# Patient Record
Sex: MALE | Race: WHITE
[De-identification: names, ages, dates, MRNs, and addresses within clinical notes are randomized per-mention and may not be internally consistent; named-entity substitution may affect disease eponyms.]

---

## 2019-09-06 PROBLEM — Z00.00 ENCOUNTER FOR PREVENTIVE HEALTH EXAMINATION: Status: ACTIVE | Noted: 2019-09-06

## 2019-09-11 PROBLEM — M25.562 LEFT KNEE PAIN, UNSPECIFIED CHRONICITY: Status: ACTIVE | Noted: 2019-09-11

## 2019-09-12 ENCOUNTER — APPOINTMENT (OUTPATIENT)
Dept: ORTHOPEDIC SURGERY | Facility: CLINIC | Age: 79
End: 2019-09-12
Payer: MEDICARE

## 2019-09-12 VITALS — TEMPERATURE: 98.1 F | HEART RATE: 72 BPM | DIASTOLIC BLOOD PRESSURE: 89 MMHG | SYSTOLIC BLOOD PRESSURE: 163 MMHG

## 2019-09-12 VITALS
TEMPERATURE: 98 F | BODY MASS INDEX: 23.62 KG/M2 | DIASTOLIC BLOOD PRESSURE: 88 MMHG | WEIGHT: 165 LBS | HEART RATE: 67 BPM | SYSTOLIC BLOOD PRESSURE: 172 MMHG | HEIGHT: 70 IN

## 2019-09-12 DIAGNOSIS — M17.12 UNILATERAL PRIMARY OSTEOARTHRITIS, LEFT KNEE: ICD-10-CM

## 2019-09-12 DIAGNOSIS — M25.562 PAIN IN LEFT KNEE: ICD-10-CM

## 2019-09-12 DIAGNOSIS — Z86.73 PERSONAL HISTORY OF TRANSIENT ISCHEMIC ATTACK (TIA), AND CEREBRAL INFARCTION W/OUT RESIDUAL DEFICITS: ICD-10-CM

## 2019-09-12 DIAGNOSIS — Z87.39 PERSONAL HISTORY OF OTHER DISEASES OF THE MUSCULOSKELETAL SYSTEM AND CONNECTIVE TISSUE: ICD-10-CM

## 2019-09-12 DIAGNOSIS — Z72.89 OTHER PROBLEMS RELATED TO LIFESTYLE: ICD-10-CM

## 2019-09-12 DIAGNOSIS — Z86.79 PERSONAL HISTORY OF OTHER DISEASES OF THE CIRCULATORY SYSTEM: ICD-10-CM

## 2019-09-12 DIAGNOSIS — Z82.61 FAMILY HISTORY OF ARTHRITIS: ICD-10-CM

## 2019-09-12 PROCEDURE — 99203 OFFICE O/P NEW LOW 30 MIN: CPT

## 2019-09-12 PROCEDURE — 73560 X-RAY EXAM OF KNEE 1 OR 2: CPT | Mod: LT

## 2019-09-20 PROBLEM — Z87.39 HISTORY OF ARTHRITIS: Status: RESOLVED | Noted: 2019-09-20 | Resolved: 2019-09-20

## 2019-09-20 PROBLEM — Z86.79 HISTORY OF CARDIAC DISORDER: Status: RESOLVED | Noted: 2019-09-20 | Resolved: 2019-09-20

## 2019-09-20 PROBLEM — Z72.89 CONSUMES ALCOHOL: Status: ACTIVE | Noted: 2019-09-20

## 2019-09-20 PROBLEM — Z87.39 HISTORY OF RHEUMATOID ARTHRITIS: Status: RESOLVED | Noted: 2019-09-20 | Resolved: 2019-09-20

## 2019-09-20 PROBLEM — Z82.61 FAMILY HISTORY OF ARTHRITIS: Status: ACTIVE | Noted: 2019-09-20

## 2019-09-20 PROBLEM — Z86.73 HISTORY OF STROKE: Status: RESOLVED | Noted: 2019-09-20 | Resolved: 2019-09-20

## 2019-09-20 PROBLEM — M17.12 PRIMARY OSTEOARTHRITIS OF LEFT KNEE: Status: ACTIVE | Noted: 2019-09-20

## 2019-09-23 NOTE — CONSULT LETTER
[Dear  ___] : Dear  [unfilled], [Please see my note below.] : Please see my note below. [Consult Letter:] : I had the pleasure of evaluating your patient, [unfilled]. [Consult Closing:] : Thank you very much for allowing me to participate in the care of this patient.  If you have any questions, please do not hesitate to contact me. [Sincerely,] : Sincerely, [FreeTextEntry3] : Jay Rios III, M.D.

## 2019-09-23 NOTE — REVIEW OF SYSTEMS
[Negative] : Heme/Lymph [FreeTextEntry9] : Joint pain [FreeTextEntry2] : Feeling tired [de-identified] : Dizziness [de-identified] : Feeling weak

## 2019-09-23 NOTE — PHYSICAL EXAM
[de-identified] : Right Knee: \par Range of Motion in Degrees	\par 	                  Claimant:	Normal:	\par Flexion Active	  135 	                135-degrees	\par Flexion Passive	  135	                135-degrees	\par Extension Active	  0-5	                0-5-degrees	\par Extension Passive	  0-5	                0-5-degrees	\par \par No weakness to flexion/extension. No evidence of instability in the AP plane or varus or valgus stress.  Negative  Lachman.  Negative pivot shift.  Negative anterior drawer test.  Negative posterior drawer test.  Negative Adela.  Negative Apley grind.  No medial or lateral joint line tenderness.  Positive tenderness over the medial and lateral facet of the patella.  Positive patellofemoral crepitations.  No lateral tilting patella.  No patella apprehension.  Positive crepitation in the medial and lateral femoral condyle.  No proximal or distal swelling, edema or tenderness.  No gross motor or sensory deficits. Mild intra-articular swelling.  2+ DP and PT pulses. Mild varus deformity.  No valgus malalignment.  Skin is intact.  No rashes, scars or lesions. \par \par Left Knee: \par Range of Motion in Degrees	\par 	                  Claimant:	Normal:	\par Flexion Active	  135 	                135-degrees	\par Flexion Passive	  135	                135-degrees	\par Extension Active	  0-5	                0-5-degrees	\par Extension Passive	  0-5	                0-5-degrees	\par \par No weakness to flexion/extension. No evidence of instability in the AP plane or varus or valgus stress.  Negative  Lachman.  Negative pivot shift.  Negative anterior drawer test.  Negative posterior drawer test.  Negative Adela.  Negative Apley grind.  No medial or lateral joint line tenderness.  Positive tenderness over the medial and lateral facet of the patella.  Positive patellofemoral crepitations.  No lateral tilting patella.  No patella apprehension.  Positive crepitation in the medial and lateral femoral condyle.  No proximal or distal swelling, edema or tenderness.  No gross motor or sensory deficits. Mild intra-articular swelling.  2+ DP and PT pulses. Mild varus deformity.  No valgus malalignment.  Skin is intact.  No rashes, scars or lesions. \par  [de-identified] : Ambulating with a slightly antalgic to antalgic gait.  Station:  Normal.  [de-identified] : General Appearance:  Well-developed, well-nourished male in no acute distress. \par  [de-identified] : Radiographs, two views of the left knee, shows end stage arthritis of the medial compartment with bone on bone articulations.

## 2019-09-23 NOTE — HISTORY OF PRESENT ILLNESS
[] : Yes [de-identified] : The patient comes in today and complains of pain to his bilateral knees, left much worse than the right.  He was seen and treated elsewhere with viscosupplementation and physical therapy with no significant improvement.  This injury is not work related or due to an automobile accident.  The patient states the pain is constant.  The patient describes the pain as sharp.  The patient states rest makes the pain better while walking and stairs makes the pain worse.\par \par Pain level includes a current pain level of 6/10.\par \par Ailment Interference:  \par Normal Work:  6/10\par Sleep:  0/10\par Enjoyment of Life:  6/10\par Climbing Stairs:  7/10\par Sports:  9/10\par Extra-Curricular Activities:  0/10

## 2019-09-23 NOTE — ADDENDUM
[FreeTextEntry1] : This note was written by Dotty Carr on 09/21/2019 acting as scribe for Jay Rios III, MD

## 2019-10-30 ENCOUNTER — OUTPATIENT (OUTPATIENT)
Dept: OUTPATIENT SERVICES | Facility: HOSPITAL | Age: 79
LOS: 1 days | End: 2019-10-30
Payer: MEDICARE

## 2019-10-30 VITALS
SYSTOLIC BLOOD PRESSURE: 142 MMHG | OXYGEN SATURATION: 99 % | HEART RATE: 65 BPM | TEMPERATURE: 98 F | RESPIRATION RATE: 16 BRPM | HEIGHT: 70 IN | DIASTOLIC BLOOD PRESSURE: 68 MMHG | WEIGHT: 166.89 LBS

## 2019-10-30 DIAGNOSIS — M17.12 UNILATERAL PRIMARY OSTEOARTHRITIS, LEFT KNEE: ICD-10-CM

## 2019-10-30 DIAGNOSIS — Z29.9 ENCOUNTER FOR PROPHYLACTIC MEASURES, UNSPECIFIED: ICD-10-CM

## 2019-10-30 DIAGNOSIS — Z98.890 OTHER SPECIFIED POSTPROCEDURAL STATES: Chronic | ICD-10-CM

## 2019-10-30 DIAGNOSIS — Z95.1 PRESENCE OF AORTOCORONARY BYPASS GRAFT: Chronic | ICD-10-CM

## 2019-10-30 DIAGNOSIS — Z01.818 ENCOUNTER FOR OTHER PREPROCEDURAL EXAMINATION: ICD-10-CM

## 2019-10-30 LAB
ANION GAP SERPL CALC-SCNC: 2 MMOL/L — LOW (ref 5–17)
APTT BLD: 31.9 SEC — SIGNIFICANT CHANGE UP (ref 27.5–36.3)
BASOPHILS # BLD AUTO: 0.05 K/UL — SIGNIFICANT CHANGE UP (ref 0–0.2)
BASOPHILS NFR BLD AUTO: 0.8 % — SIGNIFICANT CHANGE UP (ref 0–2)
BUN SERPL-MCNC: 24 MG/DL — HIGH (ref 7–23)
CALCIUM SERPL-MCNC: 9.6 MG/DL — SIGNIFICANT CHANGE UP (ref 8.5–10.1)
CHLORIDE SERPL-SCNC: 105 MMOL/L — SIGNIFICANT CHANGE UP (ref 96–108)
CO2 SERPL-SCNC: 32 MMOL/L — HIGH (ref 22–31)
CREAT SERPL-MCNC: 1.11 MG/DL — SIGNIFICANT CHANGE UP (ref 0.5–1.3)
EOSINOPHIL # BLD AUTO: 0.43 K/UL — SIGNIFICANT CHANGE UP (ref 0–0.5)
EOSINOPHIL NFR BLD AUTO: 6.7 % — HIGH (ref 0–6)
GLUCOSE SERPL-MCNC: 113 MG/DL — HIGH (ref 70–99)
HBA1C BLD-MCNC: 5.8 % — HIGH (ref 4–5.6)
HCT VFR BLD CALC: 40.1 % — SIGNIFICANT CHANGE UP (ref 39–50)
HGB BLD-MCNC: 14.1 G/DL — SIGNIFICANT CHANGE UP (ref 13–17)
IMM GRANULOCYTES NFR BLD AUTO: 0.2 % — SIGNIFICANT CHANGE UP (ref 0–1.5)
INR BLD: 1.18 RATIO — HIGH (ref 0.88–1.16)
LYMPHOCYTES # BLD AUTO: 1.42 K/UL — SIGNIFICANT CHANGE UP (ref 1–3.3)
LYMPHOCYTES # BLD AUTO: 22.1 % — SIGNIFICANT CHANGE UP (ref 13–44)
MCHC RBC-ENTMCNC: 31.3 PG — SIGNIFICANT CHANGE UP (ref 27–34)
MCHC RBC-ENTMCNC: 35.2 GM/DL — SIGNIFICANT CHANGE UP (ref 32–36)
MCV RBC AUTO: 89.1 FL — SIGNIFICANT CHANGE UP (ref 80–100)
MONOCYTES # BLD AUTO: 0.51 K/UL — SIGNIFICANT CHANGE UP (ref 0–0.9)
MONOCYTES NFR BLD AUTO: 7.9 % — SIGNIFICANT CHANGE UP (ref 2–14)
NEUTROPHILS # BLD AUTO: 4 K/UL — SIGNIFICANT CHANGE UP (ref 1.8–7.4)
NEUTROPHILS NFR BLD AUTO: 62.3 % — SIGNIFICANT CHANGE UP (ref 43–77)
PLATELET # BLD AUTO: 228 K/UL — SIGNIFICANT CHANGE UP (ref 150–400)
POTASSIUM SERPL-MCNC: 4.7 MMOL/L — SIGNIFICANT CHANGE UP (ref 3.5–5.3)
POTASSIUM SERPL-SCNC: 4.7 MMOL/L — SIGNIFICANT CHANGE UP (ref 3.5–5.3)
PROTHROM AB SERPL-ACNC: 13.2 SEC — HIGH (ref 10–12.9)
RBC # BLD: 4.5 M/UL — SIGNIFICANT CHANGE UP (ref 4.2–5.8)
RBC # FLD: 11.8 % — SIGNIFICANT CHANGE UP (ref 10.3–14.5)
SODIUM SERPL-SCNC: 139 MMOL/L — SIGNIFICANT CHANGE UP (ref 135–145)
WBC # BLD: 6.42 K/UL — SIGNIFICANT CHANGE UP (ref 3.8–10.5)
WBC # FLD AUTO: 6.42 K/UL — SIGNIFICANT CHANGE UP (ref 3.8–10.5)

## 2019-10-30 PROCEDURE — 85025 COMPLETE CBC W/AUTO DIFF WBC: CPT

## 2019-10-30 PROCEDURE — 71046 X-RAY EXAM CHEST 2 VIEWS: CPT

## 2019-10-30 PROCEDURE — 83036 HEMOGLOBIN GLYCOSYLATED A1C: CPT

## 2019-10-30 PROCEDURE — G0463: CPT | Mod: 25

## 2019-10-30 PROCEDURE — 93005 ELECTROCARDIOGRAM TRACING: CPT

## 2019-10-30 PROCEDURE — 86900 BLOOD TYPING SEROLOGIC ABO: CPT

## 2019-10-30 PROCEDURE — 85610 PROTHROMBIN TIME: CPT

## 2019-10-30 PROCEDURE — 71046 X-RAY EXAM CHEST 2 VIEWS: CPT | Mod: 26

## 2019-10-30 PROCEDURE — 86901 BLOOD TYPING SEROLOGIC RH(D): CPT

## 2019-10-30 PROCEDURE — 87640 STAPH A DNA AMP PROBE: CPT

## 2019-10-30 PROCEDURE — 85730 THROMBOPLASTIN TIME PARTIAL: CPT

## 2019-10-30 PROCEDURE — 36415 COLL VENOUS BLD VENIPUNCTURE: CPT

## 2019-10-30 PROCEDURE — 93010 ELECTROCARDIOGRAM REPORT: CPT

## 2019-10-30 PROCEDURE — 86850 RBC ANTIBODY SCREEN: CPT

## 2019-10-30 PROCEDURE — 87641 MR-STAPH DNA AMP PROBE: CPT

## 2019-10-30 PROCEDURE — 80048 BASIC METABOLIC PNL TOTAL CA: CPT

## 2019-10-30 NOTE — H&P PST ADULT - NSICDXPASTMEDICALHX_GEN_ALL_CORE_FT
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

## 2019-10-30 NOTE — H&P PST ADULT - ASSESSMENT
79 years old male present to PST prior to left total knee replacement with Dr. Gabriel TAPIA.    Plan   1. NPO after midnight  2. Take the following medications with sips of water on the day of procedure: Amlodipine  3. Use E-Z sponge as directed  4. Use Mupirocin as directed.  5. Drink a quart of extra  fluids the day before your surgery.  6 Medical optimization for surgery with Keara Caraballo and cardiac optimization with Dr. Merino  7. CBC, BMP, PT/ INR and PTT, HGB AIC, Type and Screen, MRSA sent to lab  8. EKG, left knee x-ray and Chest x- ray done    CAPRINI SCORE [CLOT]    AGE RELATED RISK FACTORS                                                       MOBILITY RELATED FACTORS  [ ] Age 41-60 years                                            (1 Point)                  [ ] Bed rest                                                        (1 Point)  [ ] Age: 61-74 years                                           (2 Points)                 [ ] Plaster cast                                                   (2 Points)  [x ] Age> 75 years                                              (3 Points)                 [ ] Bed bound for more than 72 hours                 (2 Points)    DISEASE RELATED RISK FACTORS                                               GENDER SPECIFIC FACTORS  [ ] Edema in the lower extremities                       (1 Point)                  [ ] Pregnancy                                                     (1 Point)  [ ] Varicose veins                                               (1 Point)                  [ ] Post-partum < 6 weeks                                   (1 Point)             [x ] BMI > 25 Kg/m2                                            (1 Point)                  [ ] Hormonal therapy  or oral contraception          (1 Point)                 [ ] Sepsis (in the previous month)                        (1 Point)                  [ ] History of pregnancy complications                 (1 point)  [ ] Pneumonia or serious lung disease                                               [ ] Unexplained or recurrent                     (1 Point)           (in the previous month)                               (1 Point)  [ ] Abnormal pulmonary function test                     (1 Point)                 SURGERY RELATED RISK FACTORS  [ ] Acute myocardial infarction                              (1 Point)                 [ ]  Section                                             (1 Point)  [ ] Congestive heart failure (in the previous month)  (1 Point)               [ ] Minor surgery                                                  (1 Point)   [ ] Inflammatory bowel disease                             (1 Point)                 [ ] Arthroscopic surgery                                        (2 Points)  [ ] Central venous access                                      (2 Points)                [ ] General surgery lasting more than 45 minutes   (2 Points)       [ ] Stroke (in the previous month)                          (5 Points)               [x ] Elective arthroplasty                                         (5 Points)            [ ] malignancy present or previous                      (2 points)                                                                                                                                   HEMATOLOGY RELATED FACTORS                                                 TRAUMA RELATED RISK FACTORS  [ ] Prior episodes of VTE                                     (3 Points)                 [ ] Fracture of the hip, pelvis, or leg                       (5 Points)  [ ] Positive family history for VTE                         (3 Points)                 [ ] Acute spinal cord injury (in the previous month)  (5 Points)  [ ] Prothrombin 36968 A                                     (3 Points)                 [ ] Paralysis  (less than 1 month)                             (5 Points)  [ ] Factor V Leiden                                             (3 Points)                  [ ] Multiple Trauma within 1 month                        (5 Points)  [ ] Lupus anticoagulants                                     (3 Points)                                                           [ ] Anticardiolipin antibodies                               (3 Points)                                                       [ ] High homocysteine in the blood                      (3 Points)                                             [ ] Other congenital or acquired thrombophilia      (3 Points)                                                [ ] Heparin induced thrombocytopenia                  (3 Points)                                          Total Score [  9        ] 79 years old male present to PST prior to left total knee replacement with Dr. Gabriel TAPIA.    Plan   1. NPO after midnight  2. Take the following medications with sips of water on the day of procedure: Amlodipine  3. Use E-Z sponge as directed  4. Use Mupirocin as directed.  5. Drink a quart of extra  fluids the day before your surgery.  6 Medical optimization for surgery with Keara Caraballo and cardiac optimization with Dr. Merino  7. CBC, BMP, PT/ INR and PTT, HGB AIC, Type and Screen, MRSA sent to lab  8. EKG, left knee x-ray and Chest x- ray done  9. Aspirin and Eliquis as per Cardiologist    CAPRINI SCORE [CLOT]    AGE RELATED RISK FACTORS                                                       MOBILITY RELATED FACTORS  [ ] Age 41-60 years                                            (1 Point)                  [ ] Bed rest                                                        (1 Point)  [ ] Age: 61-74 years                                           (2 Points)                 [ ] Plaster cast                                                   (2 Points)  [x ] Age> 75 years                                              (3 Points)                 [ ] Bed bound for more than 72 hours                 (2 Points)    DISEASE RELATED RISK FACTORS                                               GENDER SPECIFIC FACTORS  [ ] Edema in the lower extremities                       (1 Point)                  [ ] Pregnancy                                                     (1 Point)  [ ] Varicose veins                                               (1 Point)                  [ ] Post-partum < 6 weeks                                   (1 Point)             [x ] BMI > 25 Kg/m2                                            (1 Point)                  [ ] Hormonal therapy  or oral contraception          (1 Point)                 [ ] Sepsis (in the previous month)                        (1 Point)                  [ ] History of pregnancy complications                 (1 point)  [ ] Pneumonia or serious lung disease                                               [ ] Unexplained or recurrent                     (1 Point)           (in the previous month)                               (1 Point)  [ ] Abnormal pulmonary function test                     (1 Point)                 SURGERY RELATED RISK FACTORS  [ ] Acute myocardial infarction                              (1 Point)                 [ ]  Section                                             (1 Point)  [ ] Congestive heart failure (in the previous month)  (1 Point)               [ ] Minor surgery                                                  (1 Point)   [ ] Inflammatory bowel disease                             (1 Point)                 [ ] Arthroscopic surgery                                        (2 Points)  [ ] Central venous access                                      (2 Points)                [ ] General surgery lasting more than 45 minutes   (2 Points)       [ ] Stroke (in the previous month)                          (5 Points)               [x ] Elective arthroplasty                                         (5 Points)            [ ] malignancy present or previous                      (2 points)                                                                                                                                   HEMATOLOGY RELATED FACTORS                                                 TRAUMA RELATED RISK FACTORS  [ ] Prior episodes of VTE                                     (3 Points)                 [ ] Fracture of the hip, pelvis, or leg                       (5 Points)  [ ] Positive family history for VTE                         (3 Points)                 [ ] Acute spinal cord injury (in the previous month)  (5 Points)  [ ] Prothrombin 85543 A                                     (3 Points)                 [ ] Paralysis  (less than 1 month)                             (5 Points)  [ ] Factor V Leiden                                             (3 Points)                  [ ] Multiple Trauma within 1 month                        (5 Points)  [ ] Lupus anticoagulants                                     (3 Points)                                                           [ ] Anticardiolipin antibodies                               (3 Points)                                                       [ ] High homocysteine in the blood                      (3 Points)                                             [ ] Other congenital or acquired thrombophilia      (3 Points)                                                [ ] Heparin induced thrombocytopenia                  (3 Points)                                          Total Score [  9        ]

## 2019-10-30 NOTE — H&P PST ADULT - NSICDXFAMILYHX_GEN_ALL_CORE_FT
FAMILY HISTORY:  Family history of heart disease, father   Family history of stomach cancer, mother

## 2019-10-30 NOTE — H&P PST ADULT - HISTORY OF PRESENT ILLNESS
79 years old male with osteoarthritis of left knee. Admits to constant pain to left knee. Admits also to imbalance to left knee. Denies radiation of pain. Denies falls due to knee. Admit Planned left total knee replacement.

## 2019-10-31 DIAGNOSIS — Z01.818 ENCOUNTER FOR OTHER PREPROCEDURAL EXAMINATION: ICD-10-CM

## 2019-10-31 DIAGNOSIS — M17.12 UNILATERAL PRIMARY OSTEOARTHRITIS, LEFT KNEE: ICD-10-CM

## 2019-10-31 LAB
MRSA PCR RESULT.: SIGNIFICANT CHANGE UP
S AUREUS DNA NOSE QL NAA+PROBE: DETECTED

## 2019-11-06 ENCOUNTER — OUTPATIENT (OUTPATIENT)
Dept: OUTPATIENT SERVICES | Facility: HOSPITAL | Age: 79
LOS: 1 days | End: 2019-11-06
Payer: MEDICARE

## 2019-11-06 DIAGNOSIS — Z95.1 PRESENCE OF AORTOCORONARY BYPASS GRAFT: Chronic | ICD-10-CM

## 2019-11-06 DIAGNOSIS — M17.12 UNILATERAL PRIMARY OSTEOARTHRITIS, LEFT KNEE: ICD-10-CM

## 2019-11-06 DIAGNOSIS — Z98.890 OTHER SPECIFIED POSTPROCEDURAL STATES: Chronic | ICD-10-CM

## 2019-11-06 PROBLEM — N40.0 BENIGN PROSTATIC HYPERPLASIA WITHOUT LOWER URINARY TRACT SYMPTOMS: Chronic | Status: ACTIVE | Noted: 2019-10-30

## 2019-11-06 PROBLEM — I48.91 UNSPECIFIED ATRIAL FIBRILLATION: Chronic | Status: ACTIVE | Noted: 2019-10-30

## 2019-11-06 PROBLEM — I25.10 ATHEROSCLEROTIC HEART DISEASE OF NATIVE CORONARY ARTERY WITHOUT ANGINA PECTORIS: Chronic | Status: ACTIVE | Noted: 2019-10-30

## 2019-11-06 PROBLEM — E07.9 DISORDER OF THYROID, UNSPECIFIED: Chronic | Status: ACTIVE | Noted: 2019-10-30

## 2019-11-06 PROBLEM — Z85.828 PERSONAL HISTORY OF OTHER MALIGNANT NEOPLASM OF SKIN: Chronic | Status: ACTIVE | Noted: 2019-10-30

## 2019-11-06 PROBLEM — R41.3 OTHER AMNESIA: Chronic | Status: ACTIVE | Noted: 2019-10-30

## 2019-11-06 PROBLEM — I10 ESSENTIAL (PRIMARY) HYPERTENSION: Chronic | Status: ACTIVE | Noted: 2019-10-30

## 2019-11-06 PROBLEM — R73.03 PREDIABETES: Chronic | Status: ACTIVE | Noted: 2019-10-30

## 2019-11-06 PROBLEM — E78.5 HYPERLIPIDEMIA, UNSPECIFIED: Chronic | Status: ACTIVE | Noted: 2019-10-30

## 2019-11-06 PROBLEM — R42 DIZZINESS AND GIDDINESS: Chronic | Status: ACTIVE | Noted: 2019-10-30

## 2019-11-06 PROCEDURE — 73562 X-RAY EXAM OF KNEE 3: CPT | Mod: LT

## 2019-11-06 PROCEDURE — 73562 X-RAY EXAM OF KNEE 3: CPT | Mod: 26,LT

## 2019-11-07 DIAGNOSIS — M17.12 UNILATERAL PRIMARY OSTEOARTHRITIS, LEFT KNEE: ICD-10-CM

## 2019-11-12 ENCOUNTER — APPOINTMENT (OUTPATIENT)
Dept: ORTHOPEDIC SURGERY | Facility: HOSPITAL | Age: 79
End: 2019-11-12

## 2019-11-12 ENCOUNTER — RESULT REVIEW (OUTPATIENT)
Age: 79
End: 2019-11-12

## 2019-11-12 ENCOUNTER — TRANSCRIPTION ENCOUNTER (OUTPATIENT)
Age: 79
End: 2019-11-12

## 2019-11-12 ENCOUNTER — INPATIENT (INPATIENT)
Facility: HOSPITAL | Age: 79
LOS: 1 days | Discharge: HOME HEALTH | DRG: 470 | End: 2019-11-14
Attending: ORTHOPAEDIC SURGERY | Admitting: ORTHOPAEDIC SURGERY
Payer: MEDICARE

## 2019-11-12 VITALS
HEART RATE: 63 BPM | DIASTOLIC BLOOD PRESSURE: 80 MMHG | OXYGEN SATURATION: 99 % | SYSTOLIC BLOOD PRESSURE: 143 MMHG | WEIGHT: 166.01 LBS | TEMPERATURE: 98 F | RESPIRATION RATE: 15 BRPM | HEIGHT: 70 IN

## 2019-11-12 DIAGNOSIS — M17.12 UNILATERAL PRIMARY OSTEOARTHRITIS, LEFT KNEE: ICD-10-CM

## 2019-11-12 DIAGNOSIS — Z98.890 OTHER SPECIFIED POSTPROCEDURAL STATES: Chronic | ICD-10-CM

## 2019-11-12 DIAGNOSIS — Z95.1 PRESENCE OF AORTOCORONARY BYPASS GRAFT: Chronic | ICD-10-CM

## 2019-11-12 LAB
ANION GAP SERPL CALC-SCNC: 7 MMOL/L — SIGNIFICANT CHANGE UP (ref 5–17)
BUN SERPL-MCNC: 22 MG/DL — SIGNIFICANT CHANGE UP (ref 7–23)
CALCIUM SERPL-MCNC: 8.9 MG/DL — SIGNIFICANT CHANGE UP (ref 8.5–10.1)
CHLORIDE SERPL-SCNC: 107 MMOL/L — SIGNIFICANT CHANGE UP (ref 96–108)
CO2 SERPL-SCNC: 28 MMOL/L — SIGNIFICANT CHANGE UP (ref 22–31)
CREAT SERPL-MCNC: 0.79 MG/DL — SIGNIFICANT CHANGE UP (ref 0.5–1.3)
GLUCOSE SERPL-MCNC: 80 MG/DL — SIGNIFICANT CHANGE UP (ref 70–99)
HCT VFR BLD CALC: 34 % — LOW (ref 39–50)
HGB BLD-MCNC: 11.5 G/DL — LOW (ref 13–17)
MCHC RBC-ENTMCNC: 30.7 PG — SIGNIFICANT CHANGE UP (ref 27–34)
MCHC RBC-ENTMCNC: 33.8 GM/DL — SIGNIFICANT CHANGE UP (ref 32–36)
MCV RBC AUTO: 90.7 FL — SIGNIFICANT CHANGE UP (ref 80–100)
PLATELET # BLD AUTO: 183 K/UL — SIGNIFICANT CHANGE UP (ref 150–400)
POTASSIUM SERPL-MCNC: 3.8 MMOL/L — SIGNIFICANT CHANGE UP (ref 3.5–5.3)
POTASSIUM SERPL-SCNC: 3.8 MMOL/L — SIGNIFICANT CHANGE UP (ref 3.5–5.3)
RBC # BLD: 3.75 M/UL — LOW (ref 4.2–5.8)
RBC # FLD: 11.9 % — SIGNIFICANT CHANGE UP (ref 10.3–14.5)
SODIUM SERPL-SCNC: 142 MMOL/L — SIGNIFICANT CHANGE UP (ref 135–145)
WBC # BLD: 5.81 K/UL — SIGNIFICANT CHANGE UP (ref 3.8–10.5)
WBC # FLD AUTO: 5.81 K/UL — SIGNIFICANT CHANGE UP (ref 3.8–10.5)

## 2019-11-12 PROCEDURE — 97530 THERAPEUTIC ACTIVITIES: CPT | Mod: GP

## 2019-11-12 PROCEDURE — C1713: CPT

## 2019-11-12 PROCEDURE — 20985 CPTR-ASST DIR MS PX: CPT | Mod: AS,LT

## 2019-11-12 PROCEDURE — 73560 X-RAY EXAM OF KNEE 1 OR 2: CPT | Mod: LT

## 2019-11-12 PROCEDURE — 97110 THERAPEUTIC EXERCISES: CPT | Mod: GP

## 2019-11-12 PROCEDURE — 36415 COLL VENOUS BLD VENIPUNCTURE: CPT

## 2019-11-12 PROCEDURE — 85027 COMPLETE CBC AUTOMATED: CPT

## 2019-11-12 PROCEDURE — 97162 PT EVAL MOD COMPLEX 30 MIN: CPT | Mod: GP

## 2019-11-12 PROCEDURE — 97116 GAIT TRAINING THERAPY: CPT | Mod: GP

## 2019-11-12 PROCEDURE — 88305 TISSUE EXAM BY PATHOLOGIST: CPT | Mod: 26

## 2019-11-12 PROCEDURE — 27447 TOTAL KNEE ARTHROPLASTY: CPT | Mod: AS,LT

## 2019-11-12 PROCEDURE — 73560 X-RAY EXAM OF KNEE 1 OR 2: CPT | Mod: 26,LT

## 2019-11-12 PROCEDURE — 27447 TOTAL KNEE ARTHROPLASTY: CPT | Mod: LT

## 2019-11-12 PROCEDURE — 20985 CPTR-ASST DIR MS PX: CPT | Mod: LT

## 2019-11-12 PROCEDURE — 88305 TISSUE EXAM BY PATHOLOGIST: CPT

## 2019-11-12 PROCEDURE — C1776: CPT

## 2019-11-12 PROCEDURE — 80048 BASIC METABOLIC PNL TOTAL CA: CPT

## 2019-11-12 RX ORDER — CELECOXIB 200 MG/1
200 CAPSULE ORAL
Refills: 0 | Status: DISCONTINUED | OUTPATIENT
Start: 2019-11-13 | End: 2019-11-14

## 2019-11-12 RX ORDER — SODIUM CHLORIDE 9 MG/ML
1000 INJECTION, SOLUTION INTRAVENOUS
Refills: 0 | Status: DISCONTINUED | OUTPATIENT
Start: 2019-11-12 | End: 2019-11-12

## 2019-11-12 RX ORDER — OXYCODONE HYDROCHLORIDE 5 MG/1
5 TABLET ORAL ONCE
Refills: 0 | Status: DISCONTINUED | OUTPATIENT
Start: 2019-11-12 | End: 2019-11-12

## 2019-11-12 RX ORDER — ASPIRIN/CALCIUM CARB/MAGNESIUM 324 MG
325 TABLET ORAL
Refills: 0 | Status: DISCONTINUED | OUTPATIENT
Start: 2019-11-12 | End: 2019-11-13

## 2019-11-12 RX ORDER — POLYETHYLENE GLYCOL 3350 17 G/17G
17 POWDER, FOR SOLUTION ORAL DAILY
Refills: 0 | Status: DISCONTINUED | OUTPATIENT
Start: 2019-11-12 | End: 2019-11-14

## 2019-11-12 RX ORDER — PANTOPRAZOLE SODIUM 20 MG/1
40 TABLET, DELAYED RELEASE ORAL
Refills: 0 | Status: DISCONTINUED | OUTPATIENT
Start: 2019-11-12 | End: 2019-11-14

## 2019-11-12 RX ORDER — FENTANYL CITRATE 50 UG/ML
50 INJECTION INTRAVENOUS
Refills: 0 | Status: DISCONTINUED | OUTPATIENT
Start: 2019-11-12 | End: 2019-11-12

## 2019-11-12 RX ORDER — HYDROMORPHONE HYDROCHLORIDE 2 MG/ML
0.5 INJECTION INTRAMUSCULAR; INTRAVENOUS; SUBCUTANEOUS EVERY 4 HOURS
Refills: 0 | Status: DISCONTINUED | OUTPATIENT
Start: 2019-11-12 | End: 2019-11-14

## 2019-11-12 RX ORDER — PANTOPRAZOLE SODIUM 20 MG/1
1 TABLET, DELAYED RELEASE ORAL
Qty: 30 | Refills: 0
Start: 2019-11-12 | End: 2019-12-11

## 2019-11-12 RX ORDER — OXYCODONE HYDROCHLORIDE 5 MG/1
5 TABLET ORAL EVERY 4 HOURS
Refills: 0 | Status: DISCONTINUED | OUTPATIENT
Start: 2019-11-12 | End: 2019-11-14

## 2019-11-12 RX ORDER — ASCORBIC ACID 60 MG
500 TABLET,CHEWABLE ORAL
Refills: 0 | Status: DISCONTINUED | OUTPATIENT
Start: 2019-11-12 | End: 2019-11-14

## 2019-11-12 RX ORDER — SIMVASTATIN 20 MG/1
40 TABLET, FILM COATED ORAL AT BEDTIME
Refills: 0 | Status: DISCONTINUED | OUTPATIENT
Start: 2019-11-12 | End: 2019-11-14

## 2019-11-12 RX ORDER — SODIUM CHLORIDE 9 MG/ML
1000 INJECTION, SOLUTION INTRAVENOUS
Refills: 0 | Status: DISCONTINUED | OUTPATIENT
Start: 2019-11-12 | End: 2019-11-14

## 2019-11-12 RX ORDER — FOLIC ACID 0.8 MG
1 TABLET ORAL DAILY
Refills: 0 | Status: DISCONTINUED | OUTPATIENT
Start: 2019-11-12 | End: 2019-11-14

## 2019-11-12 RX ORDER — PROCHLORPERAZINE MALEATE 5 MG
10 TABLET ORAL EVERY 8 HOURS
Refills: 0 | Status: DISCONTINUED | OUTPATIENT
Start: 2019-11-12 | End: 2019-11-14

## 2019-11-12 RX ORDER — SIMVASTATIN 20 MG/1
1 TABLET, FILM COATED ORAL
Qty: 0 | Refills: 0 | DISCHARGE

## 2019-11-12 RX ORDER — DOCUSATE SODIUM 100 MG
1 CAPSULE ORAL
Qty: 14 | Refills: 0
Start: 2019-11-12 | End: 2019-11-18

## 2019-11-12 RX ORDER — MAGNESIUM OXIDE 400 MG ORAL TABLET 241.3 MG
1 TABLET ORAL
Qty: 0 | Refills: 0 | DISCHARGE

## 2019-11-12 RX ORDER — CEFAZOLIN SODIUM 1 G
2000 VIAL (EA) INJECTION EVERY 8 HOURS
Refills: 0 | Status: COMPLETED | OUTPATIENT
Start: 2019-11-12 | End: 2019-11-13

## 2019-11-12 RX ORDER — ASCORBIC ACID 60 MG
1 TABLET,CHEWABLE ORAL
Qty: 0 | Refills: 0 | DISCHARGE

## 2019-11-12 RX ORDER — ONDANSETRON 8 MG/1
4 TABLET, FILM COATED ORAL EVERY 6 HOURS
Refills: 0 | Status: DISCONTINUED | OUTPATIENT
Start: 2019-11-12 | End: 2019-11-14

## 2019-11-12 RX ORDER — OMEGA-3 ACID ETHYL ESTERS 1 G
1 CAPSULE ORAL
Qty: 0 | Refills: 0 | DISCHARGE

## 2019-11-12 RX ORDER — AMLODIPINE BESYLATE 2.5 MG/1
10 TABLET ORAL DAILY
Refills: 0 | Status: DISCONTINUED | OUTPATIENT
Start: 2019-11-12 | End: 2019-11-14

## 2019-11-12 RX ORDER — PANTOPRAZOLE SODIUM 20 MG/1
40 TABLET, DELAYED RELEASE ORAL ONCE
Refills: 0 | Status: COMPLETED | OUTPATIENT
Start: 2019-11-12 | End: 2019-11-12

## 2019-11-12 RX ORDER — SODIUM CHLORIDE 9 MG/ML
3 INJECTION INTRAMUSCULAR; INTRAVENOUS; SUBCUTANEOUS EVERY 8 HOURS
Refills: 0 | Status: DISCONTINUED | OUTPATIENT
Start: 2019-11-12 | End: 2019-11-12

## 2019-11-12 RX ORDER — ACETAMINOPHEN 500 MG
975 TABLET ORAL ONCE
Refills: 0 | Status: COMPLETED | OUTPATIENT
Start: 2019-11-12 | End: 2019-11-12

## 2019-11-12 RX ORDER — ONDANSETRON 8 MG/1
4 TABLET, FILM COATED ORAL EVERY 6 HOURS
Refills: 0 | Status: DISCONTINUED | OUTPATIENT
Start: 2019-11-12 | End: 2019-11-12

## 2019-11-12 RX ORDER — OXYCODONE HYDROCHLORIDE 5 MG/1
10 TABLET ORAL EVERY 4 HOURS
Refills: 0 | Status: DISCONTINUED | OUTPATIENT
Start: 2019-11-12 | End: 2019-11-14

## 2019-11-12 RX ORDER — APIXABAN 2.5 MG/1
1 TABLET, FILM COATED ORAL
Qty: 0 | Refills: 0 | DISCHARGE

## 2019-11-12 RX ORDER — FERROUS SULFATE 325(65) MG
325 TABLET ORAL
Refills: 0 | Status: DISCONTINUED | OUTPATIENT
Start: 2019-11-12 | End: 2019-11-14

## 2019-11-12 RX ORDER — ACETAMINOPHEN 500 MG
975 TABLET ORAL EVERY 8 HOURS
Refills: 0 | Status: DISCONTINUED | OUTPATIENT
Start: 2019-11-12 | End: 2019-11-14

## 2019-11-12 RX ORDER — FAMOTIDINE 10 MG/ML
20 INJECTION INTRAVENOUS ONCE
Refills: 0 | Status: COMPLETED | OUTPATIENT
Start: 2019-11-12 | End: 2019-11-12

## 2019-11-12 RX ORDER — DIPHENHYDRAMINE HCL 50 MG
25 CAPSULE ORAL AT BEDTIME
Refills: 0 | Status: DISCONTINUED | OUTPATIENT
Start: 2019-11-12 | End: 2019-11-14

## 2019-11-12 RX ORDER — ASPIRIN/CALCIUM CARB/MAGNESIUM 324 MG
1 TABLET ORAL
Qty: 0 | Refills: 0 | DISCHARGE

## 2019-11-12 RX ORDER — SENNA PLUS 8.6 MG/1
2 TABLET ORAL AT BEDTIME
Refills: 0 | Status: DISCONTINUED | OUTPATIENT
Start: 2019-11-12 | End: 2019-11-14

## 2019-11-12 RX ORDER — AMLODIPINE BESYLATE 2.5 MG/1
1 TABLET ORAL
Qty: 0 | Refills: 0 | DISCHARGE

## 2019-11-12 RX ADMIN — SIMVASTATIN 40 MILLIGRAM(S): 20 TABLET, FILM COATED ORAL at 21:55

## 2019-11-12 RX ADMIN — OXYCODONE HYDROCHLORIDE 5 MILLIGRAM(S): 5 TABLET ORAL at 19:59

## 2019-11-12 RX ADMIN — Medication 975 MILLIGRAM(S): at 21:55

## 2019-11-12 RX ADMIN — FAMOTIDINE 20 MILLIGRAM(S): 10 INJECTION INTRAVENOUS at 13:06

## 2019-11-12 RX ADMIN — Medication 100 MILLIGRAM(S): at 21:55

## 2019-11-12 RX ADMIN — OXYCODONE HYDROCHLORIDE 5 MILLIGRAM(S): 5 TABLET ORAL at 20:45

## 2019-11-12 RX ADMIN — Medication 975 MILLIGRAM(S): at 13:06

## 2019-11-12 RX ADMIN — PANTOPRAZOLE SODIUM 40 MILLIGRAM(S): 20 TABLET, DELAYED RELEASE ORAL at 13:06

## 2019-11-12 NOTE — PROGRESS NOTE ADULT - SUBJECTIVE AND OBJECTIVE BOX
Orthopedics Post-op Check    POD 0 Left Total Knee Arthroplasty  Pt seen and examined bedside enjoying dinner. Pain is well controlled. Pt feeling well. No nausea or vomiting.     Vital Signs Last 24 Hrs  T(C): 36.4 (11-12-19 @ 17:30), Max: 36.6 (11-12-19 @ 13:16)  T(F): 97.6 (11-12-19 @ 17:30), Max: 97.9 (11-12-19 @ 13:16)  HR: 62 (11-12-19 @ 18:00) (62 - 73)  BP: 142/67 (11-12-19 @ 18:00) (104/45 - 143/80)  BP(mean): --  RR: 12 (11-12-19 @ 18:00) (12 - 20)  SpO2: 98% (11-12-19 @ 18:00) (95% - 100%)                        11.5   5.81  )-----------( 183      ( 12 Nov 2019 17:04 )             34.0     12 Nov 2019 17:04    142    |  107    |  22     ----------------------------<  80     3.8     |  28     |  0.79     Ca    8.9        12 Nov 2019 17:04          Exam:  NAD AAOx3  Dressing clean and dry  SCDs on bilaterally  +EHL FHL TA GS  SILT toes 1-5  +DP  Calf Soft NT    A/P:  Stable POD 0 Left  Total Knee Arthroplasty  -Analgesia  -Ppx ABX  -DVT PE ppx, SCDs  -OOB PT  -Incentive spirometry

## 2019-11-12 NOTE — DISCHARGE NOTE PROVIDER - NSDCMRMEDTOKEN_GEN_ALL_CORE_FT
amLODIPine 10 mg oral tablet: 1 tab(s) orally once a day (in the morning)  aspirin 81 mg oral tablet: 1 tab(s) orally once a day (in the morning)  B Complex 50 oral tablet, extended release: 1 tab(s) orally once a day  Eliquis 5 mg oral tablet: 1 tab(s) orally 2 times a day  Fish Oil 1000 mg oral capsule: 1 cap(s) orally once a day  magnesium oxide 250 mg oral tablet: 1 tab(s) orally once a day  Multiple Vitamins oral tablet: 1 tab(s) orally once a day  simvastatin 40 mg oral tablet: 1 tab(s) orally once a day (at bedtime)  Vitamin C 1000 mg oral tablet: 1 tab(s) orally once a day amLODIPine 10 mg oral tablet: 1 tab(s) orally once a day (in the morning)  aspirin 81 mg oral tablet: 1 tab(s) orally once a day (in the morning)  B Complex 50 oral tablet, extended release: 1 tab(s) orally once a day  Colace 100 mg oral capsule: 1 cap(s) orally 2 times a day  Eliquis 5 mg oral tablet: 1 tab(s) orally 2 times a day  Fish Oil 1000 mg oral capsule: 1 cap(s) orally once a day  magnesium oxide 250 mg oral tablet: 1 tab(s) orally once a day  Multiple Vitamins oral tablet: 1 tab(s) orally once a day  oxycodone-acetaminophen 5 mg-325 mg oral tablet: 1 tab(s) orally every 4 to 6 hours, As Needed for severe pain MDD:6 tablets   Protonix 40 mg oral delayed release tablet: 1 tab(s) orally once a day   simvastatin 40 mg oral tablet: 1 tab(s) orally once a day (at bedtime)  Vitamin C 1000 mg oral tablet: 1 tab(s) orally once a day amLODIPine 10 mg oral tablet: 1 tab(s) orally once a day (in the morning)  aspirin 81 mg oral tablet: 1 tab(s) orally once a day (in the morning)  B Complex 50 oral tablet, extended release: 1 tab(s) orally once a day  Colace 100 mg oral capsule: 1 cap(s) orally 2 times a day  Eliquis 5 mg oral tablet: 1 tab(s) orally 2 times a day  Fish Oil 1000 mg oral capsule: 1 cap(s) orally once a day  magnesium oxide 250 mg oral tablet: 1 tab(s) orally once a day  MiraLax oral powder for reconstitution: 17 gram(s) orally once a day  as needed for   Multiple Vitamins oral tablet: 1 tab(s) orally once a day  oxycodone-acetaminophen 5 mg-325 mg oral tablet: 1 tab(s) orally every 4 to 6 hours, As Needed for severe pain MDD:6 tablets   Protonix 40 mg oral delayed release tablet: 1 tab(s) orally once a day   simvastatin 40 mg oral tablet: 1 tab(s) orally once a day (at bedtime)  Tylenol Extra Strength 500 mg oral tablet: 2 tab(s) orally 3 times a day   Vitamin C 1000 mg oral tablet: 1 tab(s) orally once a day

## 2019-11-12 NOTE — DISCHARGE NOTE PROVIDER - HOSPITAL COURSE
H&P:    Pt is a79y Male PAST MEDICAL & SURGICAL HISTORY:    Memory impairment: Short term    Osteoarthritis of left knee    History of skin cancer    BPH (benign prostatic hyperplasia)    Thyroid disorder: radiation as a child. 1/2 lobe as a result.    Prediabetes    History of loop recorder: peripheral edema    Hyperlipidemia    HTN (hypertension)    Atrial fibrillation    Dizziness    CAD (coronary artery disease)    H/O cardiac radiofrequency ablation: 7/ 2018    History of umbilical hernia repair: 7/2/ 2018    S/P CABG x 3: 1/ 2007             Now s/p Left Total Knee Arthroplasty. Pt is afebrile with stable vital signs. Pain is controlled. Alert and Oriented. Exam reveals intact EHL FHL TA GS, +DP. Dressing is clean and dry with a new bandage on.        VITALS**    LABS**        Hospital Course:    Patient presented to [facility] medically cleared for elective Left Knee Replacement Surgery, having failed outpatient conservative management. Prophylactic antibiotics were started before the procedure and continued for 24 hours. They were admitted after surgery to the orthopedic floor.   There were no complications during the hospital stay. All home medications were continued.         **Pt received **U PRBC post op for Acute Blood loss Anemia.        Routine consults were obtained from the Anticoagulation Team for DVT/PE prophylaxis, from Physical Therapy for twice daily PT, and from the Hospitalist for Medical Co-management. Patient was placed on anticoagulation.  Pertinent home medications were continued.  Daily labs were followed.          On POD 0 pt was stable overnight. No major event POD1-2. Pt received twice daily PT and a new dressing was applied prior to discharge. The plan is for DC to home with home PT** or to Rehab for ongoing PT**.  The orthopedic Attending is aware and agrees. 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    Prediabetes    History of loop recorder: peripheral edema    Hyperlipidemia    HTN (hypertension)    Atrial fibrillation    Dizziness    CAD (coronary artery disease)    H/O cardiac radiofrequency ablation: 7/ 2018    History of umbilical hernia repair: 7/2/ 2018    S/P CABG x 3: 1/ 2007             Now s/p Left Total Knee Arthroplasty. Pt is afebrile with stable vital signs. Pain is controlled. Alert and Oriented. Exam reveals intact EHL FHL TA GS, +DP. Dressing is clean and dry with a new bandage on.        Vital Signs Last 24 Hrs    T(C): 36.6 (14 Nov 2019 05:02), Max: 37.2 (13 Nov 2019 23:30)    T(F): 97.8 (14 Nov 2019 05:02), Max: 99 (13 Nov 2019 23:30)    HR: 63 (14 Nov 2019 05:02) (62 - 73)    BP: 138/60 (14 Nov 2019 05:02) (121/60 - 138/60)    BP(mean): 75 (13 Nov 2019 11:11) (75 - 75)    RR: 16 (14 Nov 2019 05:02) (16 - 16)    SpO2: 98% (14 Nov 2019 05:02) (96% - 100%)        Labs:                            9.9      7.68  )-----------( 133      ( 14 Nov 2019 07:21 )               28.7     11-14        139  |  107  |  26<H>    ----------------------------<  111<H>    4.4   |  29  |  0.85        Ca    8.7      14 Nov 2019 07:21        Hospital Course:    Patient presented to [facility] medically cleared for elective Left Total Knee Replacement Surgery, having failed outpatient conservative management. Prophylactic antibiotics were started before the procedure and continued for 24 hours. They were admitted after surgery to the orthopedic floor.   There were no complications during the hospital stay. All home medications were continued.         Routine consults were obtained from the Anticoagulation Team for DVT/PE prophylaxis, from Physical Therapy for twice daily PT, and from the Hospitalist for Medical Co-management. Patient was placed back on Home Eliquis for anticoagulation.  Pertinent home medications were continued.  Daily labs were followed.          On POD 0 pt was stable overnight. No major event POD1-2. Pt received twice daily PT and a new dressing was applied prior to discharge. The plan is for DC to home with home PT.  The orthopedic Attending is aware and agrees.

## 2019-11-12 NOTE — DISCHARGE NOTE PROVIDER - NSDCFUADDINST_GEN_ALL_CORE_FT
Discharge Instructions for Total Knee Arthroplasty    1.  Diet: Resume previous diet  2. Activity: WBAT, Rolling walker, Daily PT. Gentle ROM 0-full as tolerated. Walk plenty.  Keep a bump (rolled towel or blanket) under your heel to keep leg straight while in bed or chair for 2 weeks.  3. Call with: fever over 101, wound redness, drainage or open area, calf pain/calf swelling  4. Wound Care: Remove old and place new Aquacel  bandage to Knee in 7 days. RN to Remove Staples Post Op Day #14 (11/26/19) so long as wound is healed, no drainage or open area. OK to Shower with Aquacel; Must be and Aquacel bandage to shower.  Avoid direct water beating on bandage.  Continue ICE packs to knee. No bandage needed after staple removal.  5. DVT PE Prophylaxis: Managed by Anticoag Team. See Anticoagulation Instructions. See Med Rec.  6. Continue Protonix daily while on Anticoagulant. an eRx has been sent to your pharmacy.  7. Labs: Check H&H weekly while on Anticoagulation. Check INR if on Coumadin.  8. Follow Up: Dr. Rios in 21 days (1 week after staples removed);  Call to schedule.   9. Pain medications:  If going home, eRX sent to your pharmacy for .   10.***Please Call the office if any of you medications are not covered under your insurance , especially if it is a BLOOD CLOT PREVENTION/anticoagulant medication. Discharge Instructions for Left Total Knee Arthroplasty    1.  Diet: Resume previous diet  2. Activity: WBAT, Rolling walker, Daily PT. Gentle ROM 0-full as tolerated. Walk plenty.  Keep a bump (rolled towel or blanket) under your heel to keep leg straight while in bed or chair for 2 weeks.  3. Call with: fever over 101, wound redness, drainage or open area, calf pain/calf swelling  4. Wound Care: Remove old and place new Aquacel  bandage to Knee in 7 days. RN to Remove Staples Post Op Day #14 (11/26/19) so long as wound is healed, no drainage or open area. OK to Shower with Aquacel; Must be and Aquacel bandage to shower.  Avoid direct water beating on bandage.  No bandage needed after staple removal. Additionally PLEASE CHANGE BANDAGE AS NEEDED also in the event of leakage or saturation.   5. DVT PE Prophylaxis: Managed by Anticoag Team. See Anticoagulation Instructions. See Med Rec.  6. Continue Protonix daily while on Anticoagulant. an eRx has been sent to your pharmacy.  7. Labs: Check H&H weekly while on Anticoagulation.  8. Follow Up: Dr. Rios in 21 days (1 week after staples removed);  Call to schedule.   9. Pain medications:  If going home, eRX sent to your pharmacy for .   10.***Please Call the office if any of you medications are not covered under your insurance, especially if it is a BLOOD CLOT PREVENTION/anticoagulant medication.  11. Continue ICE packs to knee for 2 weeks.

## 2019-11-12 NOTE — DISCHARGE NOTE PROVIDER - NSDCACTIVITY_GEN_ALL_CORE
Walking - Outdoors allowed/Walking - Indoors allowed/No heavy lifting/straining Walking - Indoors allowed/Showering allowed/Stairs allowed/Walking - Outdoors allowed/No heavy lifting/straining

## 2019-11-12 NOTE — DISCHARGE NOTE PROVIDER - CARE PROVIDER_API CALL
Jay Rios)  Orthopaedic Surgery  [address] [place] [address]  Phone: [phone]  Fax: [phone]  Follow Up Time:

## 2019-11-12 NOTE — DISCHARGE NOTE PROVIDER - NSDCFUSCHEDAPPT_GEN_ALL_CORE_FT
WING ARROYO ; 11/12/2019 ; [facility] PreAdmits  WING ARROYO ; 11/12/2019 ; [facility]PP Orth[facility] [address]

## 2019-11-12 NOTE — DISCHARGE NOTE PROVIDER - NSDCHHASSISTDEVIC_GEN_ALL_CORE_FT
Ataxic gait due to total knee arthroplasty Ataxic gait secondary to recent Left Total Knee Arthroplasty

## 2019-11-13 ENCOUNTER — TRANSCRIPTION ENCOUNTER (OUTPATIENT)
Age: 79
End: 2019-11-13

## 2019-11-13 LAB
ANION GAP SERPL CALC-SCNC: 4 MMOL/L — LOW (ref 5–17)
BUN SERPL-MCNC: 29 MG/DL — HIGH (ref 7–23)
CALCIUM SERPL-MCNC: 8.4 MG/DL — LOW (ref 8.5–10.1)
CHLORIDE SERPL-SCNC: 107 MMOL/L — SIGNIFICANT CHANGE UP (ref 96–108)
CO2 SERPL-SCNC: 31 MMOL/L — SIGNIFICANT CHANGE UP (ref 22–31)
CREAT SERPL-MCNC: 0.9 MG/DL — SIGNIFICANT CHANGE UP (ref 0.5–1.3)
GLUCOSE SERPL-MCNC: 98 MG/DL — SIGNIFICANT CHANGE UP (ref 70–99)
HCT VFR BLD CALC: 29.5 % — LOW (ref 39–50)
HGB BLD-MCNC: 10.2 G/DL — LOW (ref 13–17)
MCHC RBC-ENTMCNC: 31.2 PG — SIGNIFICANT CHANGE UP (ref 27–34)
MCHC RBC-ENTMCNC: 34.6 GM/DL — SIGNIFICANT CHANGE UP (ref 32–36)
MCV RBC AUTO: 90.2 FL — SIGNIFICANT CHANGE UP (ref 80–100)
PLATELET # BLD AUTO: 154 K/UL — SIGNIFICANT CHANGE UP (ref 150–400)
POTASSIUM SERPL-MCNC: 4.3 MMOL/L — SIGNIFICANT CHANGE UP (ref 3.5–5.3)
POTASSIUM SERPL-SCNC: 4.3 MMOL/L — SIGNIFICANT CHANGE UP (ref 3.5–5.3)
RBC # BLD: 3.27 M/UL — LOW (ref 4.2–5.8)
RBC # FLD: 11.9 % — SIGNIFICANT CHANGE UP (ref 10.3–14.5)
SODIUM SERPL-SCNC: 142 MMOL/L — SIGNIFICANT CHANGE UP (ref 135–145)
WBC # BLD: 9.88 K/UL — SIGNIFICANT CHANGE UP (ref 3.8–10.5)
WBC # FLD AUTO: 9.88 K/UL — SIGNIFICANT CHANGE UP (ref 3.8–10.5)

## 2019-11-13 PROCEDURE — 99222 1ST HOSP IP/OBS MODERATE 55: CPT

## 2019-11-13 RX ORDER — APIXABAN 2.5 MG/1
5 TABLET, FILM COATED ORAL EVERY 12 HOURS
Refills: 0 | Status: DISCONTINUED | OUTPATIENT
Start: 2019-11-13 | End: 2019-11-14

## 2019-11-13 RX ORDER — ASPIRIN/CALCIUM CARB/MAGNESIUM 324 MG
81 TABLET ORAL DAILY
Refills: 0 | Status: DISCONTINUED | OUTPATIENT
Start: 2019-11-13 | End: 2019-11-14

## 2019-11-13 RX ADMIN — Medication 975 MILLIGRAM(S): at 05:11

## 2019-11-13 RX ADMIN — Medication 325 MILLIGRAM(S): at 17:45

## 2019-11-13 RX ADMIN — OXYCODONE HYDROCHLORIDE 5 MILLIGRAM(S): 5 TABLET ORAL at 11:46

## 2019-11-13 RX ADMIN — CELECOXIB 200 MILLIGRAM(S): 200 CAPSULE ORAL at 18:15

## 2019-11-13 RX ADMIN — CELECOXIB 200 MILLIGRAM(S): 200 CAPSULE ORAL at 05:11

## 2019-11-13 RX ADMIN — Medication 1 MILLIGRAM(S): at 11:16

## 2019-11-13 RX ADMIN — PANTOPRAZOLE SODIUM 40 MILLIGRAM(S): 20 TABLET, DELAYED RELEASE ORAL at 05:12

## 2019-11-13 RX ADMIN — CELECOXIB 200 MILLIGRAM(S): 200 CAPSULE ORAL at 17:45

## 2019-11-13 RX ADMIN — Medication 975 MILLIGRAM(S): at 21:29

## 2019-11-13 RX ADMIN — Medication 325 MILLIGRAM(S): at 05:12

## 2019-11-13 RX ADMIN — Medication 325 MILLIGRAM(S): at 11:16

## 2019-11-13 RX ADMIN — Medication 1 TABLET(S): at 11:16

## 2019-11-13 RX ADMIN — Medication 500 MILLIGRAM(S): at 05:11

## 2019-11-13 RX ADMIN — SIMVASTATIN 40 MILLIGRAM(S): 20 TABLET, FILM COATED ORAL at 21:29

## 2019-11-13 RX ADMIN — OXYCODONE HYDROCHLORIDE 5 MILLIGRAM(S): 5 TABLET ORAL at 21:28

## 2019-11-13 RX ADMIN — APIXABAN 5 MILLIGRAM(S): 2.5 TABLET, FILM COATED ORAL at 17:45

## 2019-11-13 RX ADMIN — OXYCODONE HYDROCHLORIDE 5 MILLIGRAM(S): 5 TABLET ORAL at 22:26

## 2019-11-13 RX ADMIN — AMLODIPINE BESYLATE 10 MILLIGRAM(S): 2.5 TABLET ORAL at 05:12

## 2019-11-13 RX ADMIN — OXYCODONE HYDROCHLORIDE 5 MILLIGRAM(S): 5 TABLET ORAL at 11:16

## 2019-11-13 RX ADMIN — Medication 100 MILLIGRAM(S): at 05:13

## 2019-11-13 RX ADMIN — Medication 500 MILLIGRAM(S): at 17:46

## 2019-11-13 RX ADMIN — Medication 81 MILLIGRAM(S): at 11:15

## 2019-11-13 NOTE — CONSULT NOTE ADULT - SUBJECTIVE AND OBJECTIVE BOX
c/c: left knee pain    HPI: 79M, pmh of a fib s/p ablation on eliquis, CAD, CABG, HTN, HLD, BPH, Thyroid radiation, mild MR, bradycardia, unable to tolerate bb, who is admitted for elective left knee replacement. Hospitalist service consulted for medical comanagement. Pt seen and examined. Felt some soreness at surgical site. no sob/chest pain. Was able to ambulate with physical therapy. No difficulty voiding.     ROS: all 10 systems reviewed and is as above otherwise negative.     PMH: as above  PSH: CABG, cardiac ablation, umbilical hernia repair  F/H: father-heart disease, mother-stomach ca  Social: no tobacco, ETOH-few drinks a week.   Allergies: cipro  Home meds: see med rec    Vital Signs Last 24 Hrs  T(C): 36.5 (13 Nov 2019 11:11), Max: 36.8 (13 Nov 2019 00:07)  T(F): 97.7 (13 Nov 2019 11:11), Max: 98.2 (13 Nov 2019 00:07)  HR: 62 (13 Nov 2019 11:11) (58 - 73)  BP: 121/60 (13 Nov 2019 11:11) (104/45 - 159/73)  BP(mean): 75 (13 Nov 2019 11:11) (75 - 75)  RR: 16 (13 Nov 2019 11:11) (12 - 20)  SpO2: 100% (13 Nov 2019 11:11) (95% - 100%)    PHYSICAL EXAM:  GENERAL: Comfortable, no acute distress   HEAD:  Normocephalic, atraumatic  EYES: EOMI, PERRLA  HEENT: Moist mucous membranes  NECK: Supple, No JVD  NERVOUS SYSTEM:  Alert & Oriented X3, Motor Strength 5/5 B/L upper and lower extremities  CHEST/LUNG: Clear to auscultation bilaterally  HEART: Regular rate and rhythm  ABDOMEN: Soft, Nontender, Nondistended, Bowel sounds present  GENITOURINARY: Voiding, no palpable bladder  EXTREMITIES:   No clubbing, cyanosis, or edema  MUSCULOSKELTAL- Left knee in acewrap.  SKIN-no rash    LABS:                        10.2   9.88  )-----------( 154      ( 13 Nov 2019 06:36 )             29.5     11-13    142  |  107  |  29<H>  ----------------------------<  98  4.3   |  31  |  0.90    Ca    8.4<L>      13 Nov 2019 06:36    MEDICATIONS  (STANDING):  acetaminophen   Tablet .. 975 milliGRAM(s) Oral every 8 hours  amLODIPine   Tablet 10 milliGRAM(s) Oral daily  apixaban 5 milliGRAM(s) Oral every 12 hours  ascorbic acid 500 milliGRAM(s) Oral two times a day  aspirin enteric coated 81 milliGRAM(s) Oral daily  celecoxib 200 milliGRAM(s) Oral two times a day  ferrous    sulfate 325 milliGRAM(s) Oral three times a day with meals  folic acid 1 milliGRAM(s) Oral daily  lactated ringers. 1000 milliLiter(s) (75 mL/Hr) IV Continuous <Continuous>  multivitamin 1 Tablet(s) Oral daily  pantoprazole    Tablet 40 milliGRAM(s) Oral before breakfast  polyethylene glycol 3350 17 Gram(s) Oral daily  simvastatin 40 milliGRAM(s) Oral at bedtime    MEDICATIONS  (PRN):  aluminum hydroxide/magnesium hydroxide/simethicone Suspension 30 milliLiter(s) Oral four times a day PRN Indigestion  diphenhydrAMINE 25 milliGRAM(s) Oral at bedtime PRN Insomnia  HYDROmorphone  Injectable 0.5 milliGRAM(s) SubCutaneous every 4 hours PRN Severe Pain (7 - 10)  ondansetron Injectable 4 milliGRAM(s) IV Push every 6 hours PRN Nausea and/or Vomiting  oxyCODONE    IR 5 milliGRAM(s) Oral every 4 hours PRN Mild Pain (1 - 3)  oxyCODONE    IR 10 milliGRAM(s) Oral every 4 hours PRN Moderate Pain (4 - 6)  prochlorperazine   Injectable 10 milliGRAM(s) IV Push every 8 hours PRN Nausea and/or Vomiting  senna 2 Tablet(s) Oral at bedtime PRN Constipation    ASSESSMENT AND PLAN:  79M, PMH as above a/w    1. OA left knee s/p elective left knee replacement:  -POD#1  -pain control  -physical therapy  -incentive spirometry  -bowel regimen    2. Acute blood loss anemia:  -d/t surgery  -no need for transfusion at this time.     3. Afib s/p ablation:  -on eliquis    4. h/o CAD/CABG:  -continue asa/statin  -unable to tolerate bb d/t bradycardia.     5. HTN:  -continue norvasc with hold parameters.     6. DVT px    thank you, will follow

## 2019-11-13 NOTE — CONSULT NOTE ADULT - ASSESSMENT
This is a 79 year old male s/p left total knee replacement with high risk for vTE due to age, immobility, and surgery. Concurrent history of atrial fibrillation on Eliquis at home. [id number] score 3. Recommend resuming Eliquis BID and educated patient on s/s of VTE and bleeding risks. Patient verbalized understanding and agreement with treatment.    Plan:  ::Resume Eliquis 5mg PO BID  ::Daily CBC/BMP  ::PPI- Protonix 40mg PO daily  ::Teto b/l  :;Enc ambulation    Dispo: Home  Will send scripts to pharmacy    Thank you for this consult, will continue to follow. This is a 79 year old male s/p left total knee replacement with high risk for vTE due to age, immobility, and surgery. Concurrent history of atrial fibrillation on Eliquis at home. [id number] score 3. Recommend resuming Eliquis BID and educated patient on s/s of VTE and bleeding risks. Patient verbalized understanding and agreement with treatment.    Plan:  ::Resume Eliquis 5mg PO BID  ::Daily CBC/BMP  ::PPI- Protonix 40mg PO daily  ::Teto b/l  :;Enc ambulation    Dispo: Home    Thank you for this consult, will continue to follow.

## 2019-11-13 NOTE — PHYSICAL THERAPY INITIAL EVALUATION ADULT - GENERAL OBSERVATIONS, REHAB EVAL
The pt was pleasant and cooperative, received on 2N, supine, eager to ambulate with PT. The pt had 1 IV, 1 left knee wrapped in ACE bandage and bilateral SCDs in place.

## 2019-11-13 NOTE — DISCHARGE NOTE NURSING/CASE MANAGEMENT/SOCIAL WORK - NSDCPEELIQUIS_GEN_ALL_CORE
Apixaban/Eliquis - Compliance/Apixaban/Eliquis - Follow up monitoring/Apixaban/Eliquis - Potential for adverse drug reactions and interactions/Apixaban/Eliquis - Dietary Advice

## 2019-11-13 NOTE — DISCHARGE NOTE NURSING/CASE MANAGEMENT/SOCIAL WORK - PATIENT PORTAL LINK FT
You can access the FollowMyHealth Patient Portal offered by [facility] by registering at the following website: [email or web address]. By joining [facility] [facility]’s FollowMyHealth portal, you will also be able to view your health information using other applications (apps) compatible with our system.

## 2019-11-13 NOTE — CONSULT NOTE ADULT - SUBJECTIVE AND OBJECTIVE BOX
HPI:  Patient is a 79y old  Male who presents with a chief complaint of Left knee arthroplasty (2019 19:33)    Consulted by Dr. Rios for VTE prophylaxis, risk stratification, and anticoagulation management.    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 1/2 lobe as a result.  Prediabetes  History of loop recorder: peripheral edema  Hyperlipidemia  HTN (hypertension)  Atrial fibrillation  Dizziness  CAD (coronary artery disease)  H/O cardiac radiofrequency ablation: 2018  History of umbilical hernia repair: 2018  S/P CABG x 3: 2007    BMI:23.8    CrCl:72.9    Incision Time:1445	  Procedure End Time:1602  EBL:150ml    Caprini VTE Risk Score:  CAPRINI SCORE    AGE RELATED RISK FACTORS                                                       MOBILITY RELATED FACTORS  [ ] Age 41-60 years                                            (1 Point)                  [ ] Bed rest                                                        (1 Point)  [ ] Age: 61-74 years                                           (2 Points)                [ ] Plaster cast                                                   (2 Points)  [x ] Age= 75 years                                              (3 Points)                 [ ] Bed bound for more than 72 hours                   (2 Points)    DISEASE RELATED RISK FACTORS                                               GENDER SPECIFIC FACTORS  [ ] Edema in the lower extremities                       (1 Point)           [ ] Pregnancy                                                     (1 Point)  [ ] Varicose veins                                               (1 Point)                  [ ] Post-partum < 6 weeks                                   (1 Point)             [ ] BMI > 25 Kg/m2                                            (1 Point)                  [ ] Hormonal therapy  or oral contraception            (1 Point)                 [ ] Sepsis (in the previous month)                        (1 Point)             [ ] History of pregnancy complications  [ ] Pneumonia or serious lung disease                                               [ ] Unexplained or recurrent                       (1 Point)           (in the previous month)                               (1 Point)  [ ] Abnormal pulmonary function test                     (1 Point)                 SURGERY RELATED RISK FACTORS  [ ] Acute myocardial infarction                              (1 Point)                 [ ]  Section                                            (1 Point)  [ ] Congestive heart failure (in the previous month)  (1 Point)                 [ ] Minor surgery                                                 (1 Point)   [ ] Inflammatory bowel disease                             (1 Point)                 [ ] Arthroscopic surgery                                        (2 Points)  [ ] Central venous access                                    (2 Points)                [ ] General surgery lasting more than 45 minutes   (2 Points)       [ ] Stroke (in the previous month)                          (5 Points)               [x ] Elective arthroplasty                                        (5 Points)            [ ] Malignancy (present or previous)                (2 Points)                                                                                                                                   HEMATOLOGY RELATED FACTORS                                                 TRAUMA RELATED RISK FACTORS  [ ] Prior episodes of VTE                                     (3 Points)                 [ ] Fracture of the hip, pelvis, or leg                       (5 Points)  [ ] Positive family history for VTE                         (3 Points)                 [ ] Acute spinal cord injury (in the previous month)  (5 Points)  [ ] Prothrombin 83727 A                                      (3 Points)                 [ ] Paralysis  (less than 1 month)                          (5 Points)  [ ] Factor V Leiden                                             (3 Points)                 [ ] Multiple Trauma within 1 month                         (5 Points)  [ ] Lupus anticoagulants                                     (3 Points)                                                           [ ] Anticardiolipin antibodies                                (3 Points)                                                       [ ] High homocysteine in the blood                      (3 Points)                                             [ ] Other congenital or acquired thrombophilia       (3 Points)                                                [ ] Heparin induced thrombocytopenia                  (3 Points)                                          Total Score [  8        ]    [id number]: 3    IMPROVE Bleeding Risk Score:2.5      Falls Risk:   High (x  )  Mod (  )  Low (  )    : Patient seen at bedside today POD #1. He does report "It was easier walking yesterday with the block."- But does report that he walked in hallway. He is regular cyclist and active normally. He is on Eliquis for AF. Denies any issues with bleeding or clotting. Explained resuming Eliquis today and discussed s/s of VTE. Patient able to remonstrate understanding through teachback method.    FAMILY HISTORY:  Family history of heart disease: father   Family history of stomach cancer: mother     Denies any personal or familial history of clotting or bleeding disorders.    Allergies    ciprofloxacin (Other)    Intolerances        REVIEW OF SYSTEMS    (  )Fever	     (  )Constipation	(  )SOB				(  )Headache	(  )Dysuria  (  )Chills	     (  )Melena	(  )Dyspnea present on exertion (  )Dizziness   (  )Polyuria  (  )Nausea	     (  )Hematochezia	(  )Cough         (  )Syncope   	         (  )Hematuria  (  )Vomiting    (  )Chest Pain	(  )Wheezing			(  )Weakness  (  )Diarrhea     (  )Palpitations	(  )Anorexia			( x )Myalgia    Pertinent positives in HPI and daily subjective. All other systems negative.    PHYSICAL EXAM:    Constitutional: Appears Well    Neurological: A& O x 3    Skin: Warm    Respiratory and Thorax: normal effort; Breath sounds: normal; No rales/wheezing/rhonchi  	  Cardiovascular: S1, S2, irregularly irregular, NMBR	    Gastrointestinal: BS + x 4Q, nontender	    Genitourinary:  Bladder nondistended, nontender    Musculoskeletal:   General Right:   no muscle/joint tenderness,   normal tone, no joint swelling,   ROM: full	    General Left:   no muscle/joint tenderness,   normal tone, no joint swelling,   ROM: limited    Knee: Left: Incision: ; Dressing CDI    Lower extrems:   Right: no calf tenderness              negative desmond's sign               + pedal pulses    Left:   no calf tenderness              negative desmond's sign               + pedal pulses                          10.2   9.88  )-----------( 154      ( 2019 06:36 )             29.5       -    142  |  107  |  29<H>  ----------------------------<  98  4.3   |  31  |  0.90    Ca    8.4<L>      2019 06:36    	    MEDICATIONS  (STANDING):  acetaminophen   Tablet .. 975 milliGRAM(s) Oral every 8 hours  amLODIPine   Tablet 10 milliGRAM(s) Oral daily  apixaban 5 milliGRAM(s) Oral every 12 hours  ascorbic acid 500 milliGRAM(s) Oral two times a day  aspirin enteric coated 81 milliGRAM(s) Oral daily  celecoxib 200 milliGRAM(s) Oral two times a day  ferrous    sulfate 325 milliGRAM(s) Oral three times a day with meals  folic acid 1 milliGRAM(s) Oral daily  lactated ringers. 1000 milliLiter(s) IV Continuous <Continuous>  multivitamin 1 Tablet(s) Oral daily  pantoprazole    Tablet 40 milliGRAM(s) Oral before breakfast  polyethylene glycol 3350 17 Gram(s) Oral daily  simvastatin 40 milliGRAM(s) Oral at bedtime      Vital Signs Last 24 Hrs  T(C): 36.4 (2019 05:15), Max: 36.8 (2019 00:07)  T(F): 97.6 (2019 05:15), Max: 98.2 (2019 00:07)  HR: 60 (2019 05:15) (58 - 73)  BP: 134/58 (2019 05:15) (104/45 - 159/73)  BP(mean): --  RR: 16 (2019 05:15) (12 - 20)  SpO2: 97% (2019 05:15) (95% - 100%)      **Current DVT Prophylaxis:    LMWH                   (  )  Heparin SQ           (  )  Coumadin             (  )  Xarelto                  (  )  Eliquis                   ( x )  Venodynes           ( x )  Ambulation          (x  )  UFH                       (  )  ECASA                   (  )  Contraindicated  (  )

## 2019-11-13 NOTE — PROGRESS NOTE ADULT - SUBJECTIVE AND OBJECTIVE BOX
Orthopedics      Patient seen and examined at bedside. Feeling well. Pain controlled. No n/v. No acute events overnight.    Vital Signs Last 24 Hrs  T(C): 36.4 (11-13-19 @ 05:15), Max: 36.8 (11-13-19 @ 00:07)  T(F): 97.6 (11-13-19 @ 05:15), Max: 98.2 (11-13-19 @ 00:07)  HR: 60 (11-13-19 @ 05:15) (58 - 73)  BP: 134/58 (11-13-19 @ 05:15) (104/45 - 159/73)  BP(mean): --  RR: 16 (11-13-19 @ 05:15) (12 - 20)  SpO2: 97% (11-13-19 @ 05:15) (95% - 100%)                        11.5   5.81  )-----------( 183      ( 12 Nov 2019 17:04 )             34.0     12 Nov 2019 17:04    142    |  107    |  22     ----------------------------<  80     3.8     |  28     |  0.79     Ca    8.9        12 Nov 2019 17:04          Exam:  Gen: NAD, resting comfortably  LLE:  Dressing c/d/i  NTTP calves b/l  +EHL/FHL/TA/GS  SILT L2-S1  Compartments soft and compressible  2+ Pulses palpable      A/P: 79yM POD1 s/p L TKA  -FU AM labs  -Pain control  -WBAT LLE  -DVT ppx  -Incentive Spirometry  -Elevation to extremity  -Medical management appreciated

## 2019-11-14 VITALS
DIASTOLIC BLOOD PRESSURE: 64 MMHG | OXYGEN SATURATION: 100 % | SYSTOLIC BLOOD PRESSURE: 159 MMHG | TEMPERATURE: 98 F | RESPIRATION RATE: 18 BRPM | HEART RATE: 68 BPM

## 2019-11-14 LAB
ANION GAP SERPL CALC-SCNC: 3 MMOL/L — LOW (ref 5–17)
BUN SERPL-MCNC: 26 MG/DL — HIGH (ref 7–23)
CALCIUM SERPL-MCNC: 8.7 MG/DL — SIGNIFICANT CHANGE UP (ref 8.5–10.1)
CHLORIDE SERPL-SCNC: 107 MMOL/L — SIGNIFICANT CHANGE UP (ref 96–108)
CO2 SERPL-SCNC: 29 MMOL/L — SIGNIFICANT CHANGE UP (ref 22–31)
CREAT SERPL-MCNC: 0.85 MG/DL — SIGNIFICANT CHANGE UP (ref 0.5–1.3)
GLUCOSE SERPL-MCNC: 111 MG/DL — HIGH (ref 70–99)
HCT VFR BLD CALC: 28.7 % — LOW (ref 39–50)
HGB BLD-MCNC: 9.9 G/DL — LOW (ref 13–17)
MCHC RBC-ENTMCNC: 31.1 PG — SIGNIFICANT CHANGE UP (ref 27–34)
MCHC RBC-ENTMCNC: 34.5 GM/DL — SIGNIFICANT CHANGE UP (ref 32–36)
MCV RBC AUTO: 90.3 FL — SIGNIFICANT CHANGE UP (ref 80–100)
PLATELET # BLD AUTO: 133 K/UL — LOW (ref 150–400)
POTASSIUM SERPL-MCNC: 4.4 MMOL/L — SIGNIFICANT CHANGE UP (ref 3.5–5.3)
POTASSIUM SERPL-SCNC: 4.4 MMOL/L — SIGNIFICANT CHANGE UP (ref 3.5–5.3)
RBC # BLD: 3.18 M/UL — LOW (ref 4.2–5.8)
RBC # FLD: 11.9 % — SIGNIFICANT CHANGE UP (ref 10.3–14.5)
SODIUM SERPL-SCNC: 139 MMOL/L — SIGNIFICANT CHANGE UP (ref 135–145)
WBC # BLD: 7.68 K/UL — SIGNIFICANT CHANGE UP (ref 3.8–10.5)
WBC # FLD AUTO: 7.68 K/UL — SIGNIFICANT CHANGE UP (ref 3.8–10.5)

## 2019-11-14 PROCEDURE — 99231 SBSQ HOSP IP/OBS SF/LOW 25: CPT

## 2019-11-14 PROCEDURE — 99024 POSTOP FOLLOW-UP VISIT: CPT

## 2019-11-14 RX ORDER — ACETAMINOPHEN 500 MG
2 TABLET ORAL
Qty: 84 | Refills: 0
Start: 2019-11-14 | End: 2019-11-27

## 2019-11-14 RX ORDER — POLYETHYLENE GLYCOL 3350 17 G/17G
17 POWDER, FOR SOLUTION ORAL
Qty: 1 | Refills: 0
Start: 2019-11-14 | End: 2019-11-27

## 2019-11-14 RX ADMIN — Medication 975 MILLIGRAM(S): at 06:30

## 2019-11-14 RX ADMIN — Medication 975 MILLIGRAM(S): at 06:27

## 2019-11-14 RX ADMIN — CELECOXIB 200 MILLIGRAM(S): 200 CAPSULE ORAL at 06:29

## 2019-11-14 RX ADMIN — CELECOXIB 200 MILLIGRAM(S): 200 CAPSULE ORAL at 06:30

## 2019-11-14 RX ADMIN — AMLODIPINE BESYLATE 10 MILLIGRAM(S): 2.5 TABLET ORAL at 06:27

## 2019-11-14 RX ADMIN — Medication 325 MILLIGRAM(S): at 12:10

## 2019-11-14 RX ADMIN — Medication 500 MILLIGRAM(S): at 06:29

## 2019-11-14 RX ADMIN — Medication 1 TABLET(S): at 12:10

## 2019-11-14 RX ADMIN — Medication 975 MILLIGRAM(S): at 13:13

## 2019-11-14 RX ADMIN — APIXABAN 5 MILLIGRAM(S): 2.5 TABLET, FILM COATED ORAL at 06:27

## 2019-11-14 RX ADMIN — Medication 325 MILLIGRAM(S): at 09:18

## 2019-11-14 RX ADMIN — Medication 81 MILLIGRAM(S): at 12:10

## 2019-11-14 RX ADMIN — POLYETHYLENE GLYCOL 3350 17 GRAM(S): 17 POWDER, FOR SOLUTION ORAL at 12:10

## 2019-11-14 RX ADMIN — PANTOPRAZOLE SODIUM 40 MILLIGRAM(S): 20 TABLET, DELAYED RELEASE ORAL at 06:29

## 2019-11-14 RX ADMIN — Medication 1 MILLIGRAM(S): at 12:10

## 2019-11-14 NOTE — PROGRESS NOTE ADULT - SUBJECTIVE AND OBJECTIVE BOX
Orthopedics      Patient seen and examined at bedside. Feeling well. Pain controlled. No n/v. No acute events overnight.    Vital Signs Last 24 Hrs  T(C): 37.2 (13 Nov 2019 23:30), Max: 37.2 (13 Nov 2019 23:30)  T(F): 99 (13 Nov 2019 23:30), Max: 99 (13 Nov 2019 23:30)  HR: 73 (13 Nov 2019 23:30) (60 - 73)  BP: 130/49 (13 Nov 2019 23:30) (121/60 - 137/59)  BP(mean): 75 (13 Nov 2019 11:11) (75 - 75)  RR: 16 (13 Nov 2019 23:30) (16 - 16)  SpO2: 96% (13 Nov 2019 23:30) (96% - 100%)                          11.5   5.81  )-----------( 183      ( 12 Nov 2019 17:04 )             34.0     12 Nov 2019 17:04    142    |  107    |  22     ----------------------------<  80     3.8     |  28     |  0.79     Ca    8.9        12 Nov 2019 17:04          Exam:  Gen: NAD, resting comfortably  LLE:  Dressing c/d/i  NTTP calves b/l  +EHL/FHL/TA/GS  SILT L2-S1  Compartments soft and compressible  2+ Pulses palpable      A/P: 79yM POD2 s/p L TKA  -FU AM labs  -Pain control  -WBAT LLE  -DVT ppx  -Incentive Spirometry  -Elevation to extremity  -Medical management appreciated

## 2019-11-14 NOTE — PROGRESS NOTE ADULT - ASSESSMENT
This is a 79 year old male s/p left total knee replacement with high risk for vTE due to age, immobility, and surgery. Concurrent history of atrial fibrillation on Eliquis at home. [id number] score 3.   Plan:  ::Resume Eliquis 5mg PO BID, ot states he has plenty of med at home and does not need new presciption  ::Daily CBC/BMP  ::PPI- Protonix 40mg PO daily  ::Venodynes b/l  :;Enc ambulation    Dispo: Home

## 2019-11-14 NOTE — PROGRESS NOTE ADULT - SUBJECTIVE AND OBJECTIVE BOX
HPI:  Patient is a 79y old  Male who presents with a chief complaint of Left knee arthroplasty (2019 19:33)    Consulted by Dr. Rios for VTE prophylaxis, risk stratification, and anticoagulation management.    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 1/2 lobe as a result.  Prediabetes  History of loop recorder: peripheral edema  Hyperlipidemia  HTN (hypertension)  Atrial fibrillation  Dizziness  CAD (coronary artery disease)  H/O cardiac radiofrequency ablation: 2018  History of umbilical hernia repair: 2018  S/P CABG x 3: 2007    BMI:23.8    CrCl:72.9    Incision Time:1445	  Procedure End Time:1602  EBL:150ml    Caprini VTE Risk Score:  CAPRINI SCORE    AGE RELATED RISK FACTORS                                                       MOBILITY RELATED FACTORS  [ ] Age 41-60 years                                            (1 Point)                  [ ] Bed rest                                                        (1 Point)  [ ] Age: 61-74 years                                           (2 Points)                [ ] Plaster cast                                                   (2 Points)  [x ] Age= 75 years                                              (3 Points)                 [ ] Bed bound for more than 72 hours                   (2 Points)    DISEASE RELATED RISK FACTORS                                               GENDER SPECIFIC FACTORS  [ ] Edema in the lower extremities                       (1 Point)           [ ] Pregnancy                                                     (1 Point)  [ ] Varicose veins                                               (1 Point)                  [ ] Post-partum < 6 weeks                                   (1 Point)             [ ] BMI > 25 Kg/m2                                            (1 Point)                  [ ] Hormonal therapy  or oral contraception            (1 Point)                 [ ] Sepsis (in the previous month)                        (1 Point)             [ ] History of pregnancy complications  [ ] Pneumonia or serious lung disease                                               [ ] Unexplained or recurrent                       (1 Point)           (in the previous month)                               (1 Point)  [ ] Abnormal pulmonary function test                     (1 Point)                 SURGERY RELATED RISK FACTORS  [ ] Acute myocardial infarction                              (1 Point)                 [ ]  Section                                            (1 Point)  [ ] Congestive heart failure (in the previous month)  (1 Point)                 [ ] Minor surgery                                                 (1 Point)   [ ] Inflammatory bowel disease                             (1 Point)                 [ ] Arthroscopic surgery                                        (2 Points)  [ ] Central venous access                                    (2 Points)                [ ] General surgery lasting more than 45 minutes   (2 Points)       [ ] Stroke (in the previous month)                          (5 Points)               [x ] Elective arthroplasty                                        (5 Points)            [ ] Malignancy (present or previous)                (2 Points)                                                                                                                                   HEMATOLOGY RELATED FACTORS                                                 TRAUMA RELATED RISK FACTORS  [ ] Prior episodes of VTE                                     (3 Points)                 [ ] Fracture of the hip, pelvis, or leg                       (5 Points)  [ ] Positive family history for VTE                         (3 Points)                 [ ] Acute spinal cord injury (in the previous month)  (5 Points)  [ ] Prothrombin 86905 A                                      (3 Points)                 [ ] Paralysis  (less than 1 month)                          (5 Points)  [ ] Factor V Leiden                                             (3 Points)                 [ ] Multiple Trauma within 1 month                         (5 Points)  [ ] Lupus anticoagulants                                     (3 Points)                                                           [ ] Anticardiolipin antibodies                                (3 Points)                                                       [ ] High homocysteine in the blood                      (3 Points)                                             [ ] Other congenital or acquired thrombophilia       (3 Points)                                                [ ] Heparin induced thrombocytopenia                  (3 Points)                                          Total Score [  8        ]    [id number]: 3    IMPROVE Bleeding Risk Score:2.5      Falls Risk:   High (x  )  Mod (  )  Low (  )    : Patient seen at bedside today POD #1. He does report "It was easier walking yesterday with the block."- But does report that he walked in hallway. He is regular cyclist and active normally. He is on Eliquis for AF. Denies any issues with bleeding or clotting. Explained resuming Eliquis today and discussed s/s of VTE. Patient able to remonstrate understanding through teachback method.  : seen at bedside, no concerns      FAMILY HISTORY:  Family history of heart disease: father   Family history of stomach cancer: mother     Denies any personal or familial history of clotting or bleeding disorders.    Allergies    ciprofloxacin (Other)    Intolerances        REVIEW OF SYSTEMS    (  )Fever	     (  )Constipation	(  )SOB				(  )Headache	(  )Dysuria  (  )Chills	     (  )Melena	(  )Dyspnea present on exertion (  )Dizziness   (  )Polyuria  (  )Nausea	     (  )Hematochezia	(  )Cough         (  )Syncope   	         (  )Hematuria  (  )Vomiting    (  )Chest Pain	(  )Wheezing			(  )Weakness  (  )Diarrhea     (  )Palpitations	(  )Anorexia			( x )Myalgia    Pertinent positives in HPI and daily subjective. All other systems negative.      Vital Signs Last 24 Hrs  T(C): 36.8 (19 @ 11:07), Max: 37.2 (19 @ 23:30)  T(F): 98.2 (19 @ 11:07), Max: 99 (19 @ 23:30)  HR: 68 (19 @ 11:07) (63 - 73)  BP: 159/64 (19 @ 11:07) (130/49 - 159/64)  BP(mean): --  RR: 18 (19 @ 11:07) (16 - 18)  SpO2: 100% (19 @ 11:07) (96% - 100%)    PHYSICAL EXAM:    Constitutional: Appears Well    Neurological: A& O x 3    Skin: Warm    Respiratory and Thorax: normal effort; Breath sounds: normal; No rales/wheezing/rhonchi  	  Cardiovascular: S1, S2, irregularly irregular, NMBR	    Gastrointestinal: BS + x 4Q, nontender	    Genitourinary:  Bladder nondistended, nontender    Musculoskeletal:   General Right:   no muscle/joint tenderness,   normal tone, no joint swelling,   ROM: full	    General Left:   no muscle/joint tenderness,   normal tone, no joint swelling,   ROM: limited    Knee: Left: Incision: ; Dressing CDI    Lower extrems:   Right: no calf tenderness              negative desmond's sign               + pedal pulses    Left:   no calf tenderness              negative desmond's sign               + pedal pulses                            9.9    7.68  )-----------( 133      ( 2019 07:21 )             28.7       11-14    139  |  107  |  26<H>  ----------------------------<  111<H>  4.4   |  29  |  0.85    Ca    8.7      2019 07:21                                  10.2   9.88  )-----------( 154      ( 2019 06:36 )             29.5       -    142  |  107  |  29<H>  ----------------------------<  98  4.3   |  31  |  0.90    Ca    8.4<L>      2019 06:36    MEDICATIONS  (STANDING):  acetaminophen   Tablet .. 975 milliGRAM(s) Oral every 8 hours  amLODIPine   Tablet 10 milliGRAM(s) Oral daily  apixaban 5 milliGRAM(s) Oral every 12 hours  ascorbic acid 500 milliGRAM(s) Oral two times a day  aspirin enteric coated 81 milliGRAM(s) Oral daily  celecoxib 200 milliGRAM(s) Oral two times a day  ferrous    sulfate 325 milliGRAM(s) Oral three times a day with meals  folic acid 1 milliGRAM(s) Oral daily  lactated ringers. 1000 milliLiter(s) IV Continuous <Continuous>  multivitamin 1 Tablet(s) Oral daily  pantoprazole    Tablet 40 milliGRAM(s) Oral before breakfast  polyethylene glycol 3350 17 Gram(s) Oral daily  simvastatin 40 milliGRAM(s) Oral at bedtime        **Current DVT Prophylaxis:    LMWH                   (  )  Heparin SQ           (  )  Coumadin             (  )  Xarelto                  (  )  Eliquis                   ( x )  Venodynes           ( x )  Ambulation          (x  )  UFH                       (  )  ECASA                   (  )  Contraindicated  (  )

## 2019-11-14 NOTE — PROGRESS NOTE ADULT - SUBJECTIVE AND OBJECTIVE BOX
Orthopedics Progress Note:    This is a 80y/o Male s/p Left Total Knee Arthroplasty POD 2.  Pain is controlled. Pt feeling well. No nausea or vomiting.     Vital Signs Last 24 Hrs  T(C): 36.6 (11-14-19 @ 05:02), Max: 37.2 (11-13-19 @ 23:30)  T(F): 97.8 (11-14-19 @ 05:02), Max: 99 (11-13-19 @ 23:30)  HR: 63 (11-14-19 @ 05:02) (62 - 73)  BP: 138/60 (11-14-19 @ 05:02) (121/60 - 138/60)  BP(mean): 75 (11-13-19 @ 11:11) (75 - 75)  RR: 16 (11-14-19 @ 05:02) (16 - 16)  SpO2: 98% (11-14-19 @ 05:02) (96% - 100%)                        9.9    7.68  )-----------( 133      ( 14 Nov 2019 07:21 )             28.7     14 Nov 2019 07:21    139    |  107    |  26     ----------------------------<  111    4.4     |  29     |  0.85     Ca    8.7        14 Nov 2019 07:21      Exam:  NAD AAOx3.  ACE in place; removed.  Aquacel dressing in place, clean dry and intact; changed earlier this AM.   Surgical incision is clean, dry and intact with staples in place; no active drainage.   SCDs in place.  Calves are soft and nontender.  Moving all toes and ankle, +EHL/FHL/TA/GS.  SILT throughout.  DP and PT pulses 2+.    A/P:  Stable POD 2 Right Total Knee Arthroplasty  -Analgesia  -LLE elevation  -Ice application  -DVT PE ppx  -Incentive spirometry  -OOB PT WBAT LLE  -discharge planning home today after afternoon PT    Case discussed with Dr. Rios.

## 2019-11-14 NOTE — PROGRESS NOTE ADULT - SUBJECTIVE AND OBJECTIVE BOX
c/c: left knee pain    HPI: 79M, pmh of a fib s/p ablation on eliquis, CAD, CABG, HTN, HLD, BPH, Thyroid radiation, mild MR, bradycardia, unable to tolerate bb, who is admitted for elective left knee replacement. Hospitalist service consulted for medical comanagement.     11/14: pt seen and examined this am. Felt well. Wants to go home. pain controlled. no sob/chest pain.     ROS: all 10 systems reviewed and is as above otherwise negative.   Vital Signs Last 24 Hrs  T(C): 36.6 (14 Nov 2019 05:02), Max: 37.2 (13 Nov 2019 23:30)  T(F): 97.8 (14 Nov 2019 05:02), Max: 99 (13 Nov 2019 23:30)  HR: 63 (14 Nov 2019 05:02) (63 - 73)  BP: 138/60 (14 Nov 2019 05:02) (130/49 - 138/60)  RR: 16 (14 Nov 2019 05:02) (16 - 16)  SpO2: 98% (14 Nov 2019 05:02) (96% - 100%)    PHYSICAL EXAM:  GENERAL: Comfortable, no acute distress   HEAD:  Normocephalic, atraumatic  EYES: EOMI, PERRLA  HEENT: Moist mucous membranes  NECK: Supple, No JVD  NERVOUS SYSTEM:  Alert & Oriented X3, Motor Strength 5/5 B/L upper and lower extremities  CHEST/LUNG: Clear to auscultation bilaterally  HEART: Regular rate and rhythm  ABDOMEN: Soft, Nontender, Nondistended, Bowel sounds present  GENITOURINARY: Voiding, no palpable bladder  EXTREMITIES:   No clubbing, cyanosis, or edema  MUSCULOSKELETAL- Left knee aquaceal intact  SKIN-no rash    LABS:                        9.9    7.68  )-----------( 133      ( 14 Nov 2019 07:21 )             28.7     11-14    139  |  107  |  26<H>  ----------------------------<  111<H>  4.4   |  29  |  0.85    Ca    8.7      14 Nov 2019 07:21        MEDICATIONS  (STANDING):  acetaminophen   Tablet .. 975 milliGRAM(s) Oral every 8 hours  amLODIPine   Tablet 10 milliGRAM(s) Oral daily  apixaban 5 milliGRAM(s) Oral every 12 hours  ascorbic acid 500 milliGRAM(s) Oral two times a day  aspirin enteric coated 81 milliGRAM(s) Oral daily  celecoxib 200 milliGRAM(s) Oral two times a day  ferrous    sulfate 325 milliGRAM(s) Oral three times a day with meals  folic acid 1 milliGRAM(s) Oral daily  lactated ringers. 1000 milliLiter(s) (75 mL/Hr) IV Continuous <Continuous>  multivitamin 1 Tablet(s) Oral daily  pantoprazole    Tablet 40 milliGRAM(s) Oral before breakfast  polyethylene glycol 3350 17 Gram(s) Oral daily  simvastatin 40 milliGRAM(s) Oral at bedtime    MEDICATIONS  (PRN):  aluminum hydroxide/magnesium hydroxide/simethicone Suspension 30 milliLiter(s) Oral four times a day PRN Indigestion  diphenhydrAMINE 25 milliGRAM(s) Oral at bedtime PRN Insomnia  HYDROmorphone  Injectable 0.5 milliGRAM(s) SubCutaneous every 4 hours PRN Severe Pain (7 - 10)  ondansetron Injectable 4 milliGRAM(s) IV Push every 6 hours PRN Nausea and/or Vomiting  oxyCODONE    IR 5 milliGRAM(s) Oral every 4 hours PRN Mild Pain (1 - 3)  oxyCODONE    IR 10 milliGRAM(s) Oral every 4 hours PRN Moderate Pain (4 - 6)  prochlorperazine   Injectable 10 milliGRAM(s) IV Push every 8 hours PRN Nausea and/or Vomiting  senna 2 Tablet(s) Oral at bedtime PRN Constipation    ASSESSMENT AND PLAN:  79M, PMH as above a/w    1. OA left knee s/p elective left knee replacement:  -POD#2  -pain control  -physical therapy  -incentive spirometry  -bowel regimen    2. Acute blood loss anemia:  -d/t surgery  -no need for transfusion at this time.     3. Afib s/p ablation:  -on eliquis    4. h/o CAD/CABG:  -continue asa/statin  -unable to tolerate bb d/t bradycardia.     5. HTN:  -continue norvasc with hold parameters.     6. DVT px    7. dispo:  home today or tomorrow depending on progress with physical therapy

## 2019-11-14 NOTE — CHART NOTE - NSCHARTNOTEFT_GEN_A_CORE
Called by RN to assess linear blisters in popliteal fossa of operative knee. Recommend benadryl. Patient may take benadryl prn at home or topical dyphenhydramine cream.    Khushi Hopkins M.D.  PGY-2 Orthopaedic Surgery

## 2019-11-19 DIAGNOSIS — I10 ESSENTIAL (PRIMARY) HYPERTENSION: ICD-10-CM

## 2019-11-19 DIAGNOSIS — M17.12 UNILATERAL PRIMARY OSTEOARTHRITIS, LEFT KNEE: ICD-10-CM

## 2019-11-19 DIAGNOSIS — Z95.1 PRESENCE OF AORTOCORONARY BYPASS GRAFT: ICD-10-CM

## 2019-11-19 DIAGNOSIS — I34.0 NONRHEUMATIC MITRAL (VALVE) INSUFFICIENCY: ICD-10-CM

## 2019-11-19 DIAGNOSIS — Z88.1 ALLERGY STATUS TO OTHER ANTIBIOTIC AGENTS STATUS: ICD-10-CM

## 2019-11-19 DIAGNOSIS — N40.0 BENIGN PROSTATIC HYPERPLASIA WITHOUT LOWER URINARY TRACT SYMPTOMS: ICD-10-CM

## 2019-11-19 DIAGNOSIS — Z92.3 PERSONAL HISTORY OF IRRADIATION: ICD-10-CM

## 2019-11-19 DIAGNOSIS — I25.10 ATHEROSCLEROTIC HEART DISEASE OF NATIVE CORONARY ARTERY WITHOUT ANGINA PECTORIS: ICD-10-CM

## 2019-11-19 DIAGNOSIS — D62 ACUTE POSTHEMORRHAGIC ANEMIA: ICD-10-CM

## 2019-11-19 DIAGNOSIS — R73.03 PREDIABETES: ICD-10-CM

## 2019-11-19 DIAGNOSIS — E78.5 HYPERLIPIDEMIA, UNSPECIFIED: ICD-10-CM

## 2019-11-19 DIAGNOSIS — Z85.828 PERSONAL HISTORY OF OTHER MALIGNANT NEOPLASM OF SKIN: ICD-10-CM

## 2019-11-19 DIAGNOSIS — I48.91 UNSPECIFIED ATRIAL FIBRILLATION: ICD-10-CM

## 2019-11-20 NOTE — CHART NOTE - NSCHARTNOTEFT_GEN_A_CORE
Med rec reviwed and noted to have tylenol and percocet on it. SPoke with pt 1335 he is at home taking only tylenol and not percocet. he was advised not to take tylenol on top of percocet to not overshoot the acetaminophen dosage. he was able to reiterate understanding back to me.

## 2019-11-25 ENCOUNTER — APPOINTMENT (OUTPATIENT)
Age: 79
End: 2019-11-25
Payer: MEDICARE

## 2019-11-25 VITALS
TEMPERATURE: 96.5 F | RESPIRATION RATE: 20 BRPM | HEIGHT: 70 IN | HEART RATE: 78 BPM | DIASTOLIC BLOOD PRESSURE: 76 MMHG | OXYGEN SATURATION: 98 % | SYSTOLIC BLOOD PRESSURE: 160 MMHG

## 2019-11-25 DIAGNOSIS — I10 ESSENTIAL (PRIMARY) HYPERTENSION: ICD-10-CM

## 2019-11-25 PROCEDURE — 99024 POSTOP FOLLOW-UP VISIT: CPT

## 2019-11-26 PROBLEM — I10 HYPERTENSION, UNSPECIFIED TYPE: Status: ACTIVE | Noted: 2019-09-20

## 2019-11-26 RX ORDER — SIMVASTATIN 40 MG/1
40 TABLET, FILM COATED ORAL
Qty: 90 | Refills: 1 | Status: ACTIVE | COMMUNITY
Start: 2019-11-26

## 2019-11-26 RX ORDER — AMLODIPINE BESYLATE 10 MG/1
10 TABLET ORAL
Qty: 90 | Refills: 3 | Status: ACTIVE | COMMUNITY
Start: 2019-11-26

## 2019-11-26 RX ORDER — CHOLECALCIFEROL (VITAMIN D3) 50 MCG
250 CAPSULE ORAL DAILY
Refills: 0 | Status: ACTIVE | COMMUNITY
Start: 2019-11-26

## 2019-11-26 RX ORDER — ACETAMINOPHEN 500 MG/1
500 TABLET ORAL 3 TIMES DAILY
Refills: 0 | Status: ACTIVE | COMMUNITY
Start: 2019-11-26

## 2019-11-26 RX ORDER — LORATADINE 5 MG
17 TABLET,CHEWABLE ORAL
Refills: 0 | Status: ACTIVE | COMMUNITY
Start: 2019-11-26

## 2019-11-26 RX ORDER — APIXABAN 5 MG/1
5 TABLET, FILM COATED ORAL
Qty: 180 | Refills: 1 | Status: ACTIVE | COMMUNITY
Start: 2019-11-26

## 2019-11-26 RX ORDER — ASPIRIN ENTERIC COATED TABLETS 81 MG 81 MG/1
81 TABLET, DELAYED RELEASE ORAL DAILY
Qty: 100 | Refills: 2 | Status: ACTIVE | COMMUNITY
Start: 2019-11-26

## 2019-11-26 RX ORDER — DOCUSATE SODIUM 100 MG/1
100 CAPSULE ORAL TWICE DAILY
Refills: 0 | Status: ACTIVE | COMMUNITY
Start: 2019-11-26

## 2019-11-26 RX ORDER — PANTOPRAZOLE SODIUM 40 MG/1
40 TABLET, DELAYED RELEASE ORAL
Refills: 0 | Status: ACTIVE | COMMUNITY
Start: 2019-11-26

## 2019-11-26 NOTE — PHYSICAL EXAM
[Well Nourished] : well nourished [No Acute Distress] : no acute distress [Well-Appearing] : well-appearing [Normal Voice/Communication] : normal voice/communication [Well Developed] : well developed [Supple] : supple [Normal] : no acute distress, well nourished, well developed and well-appearing [Clear to Auscultation] : lungs were clear to auscultation bilaterally [Normal Rate] : normal rate  [No Accessory Muscle Use] : no accessory muscle use [No Respiratory Distress] : no respiratory distress  [Normal S1, S2] : normal S1 and S2 [Regular Rhythm] : with a regular rhythm [Pedal Pulses Present] : the pedal pulses are present [Soft] : abdomen soft [Non Tender] : non-tender [Non-distended] : non-distended [No Focal Deficits] : no focal deficits [de-identified] : +1-2 left lower ext edema [de-identified] : Left knee with aquacel C/D/I

## 2019-11-26 NOTE — HISTORY OF PRESENT ILLNESS
[Spouse] : spouse [FreeTextEntry1] : Follow up S/P 11/12/19 LTKR Dr Rios.  Pt is temporarily unable to leave home as it requires a considerable and taxing effort, currently exhibits an unsteady gait and requires assistive device to leave home.  \par \par  [de-identified] : 78 y/o male with pmhx of CAD, HTN, HPL, BPH, Afib who now is S/P LTKR on 11/12/19 at [facility] with Dr Rios. His daughter is a PT and called with c/o increased swelling, pain and calf tenderness. Upon entering home pt appears comfortable, resting in chair, left lower extremity elevated. He tells me that his pain is mild and is relieved with Tylenol only. Medication reconciliation complete. He is taking Eliquis BID as directed for AC. He denies missing any doses. He has not taken any Oxycodone and reports he does not need it. He is participating in daily PT and walking outside prn. He is eating and drinking without difficulty. He takes Miralax daily prn BM. He reports  a BM today. His wife is home for support. He denies fever, chills, CP,SOB, N/V/C, or dizziness. He reports mild calf pain when walking.

## 2019-11-26 NOTE — PLAN
[FreeTextEntry1] : LTKR\par Left lower extremity venous doppler ordered to home and completed. Results are negative for DVT.\par Reinforced elevating and icing for swelling. Proper technique discussed.\par Reassured that pain and swelling are typical for fist week post op. \par C/W meds including Eliquis as prescribed.\par Follow up with Dr Rios, appt next week. \par Instructed to keep incision clean, dry, and intact as per post op instructions.  Home care RN to remove staples this week.\par \par HTN\par Pt reports his BP is elevated due to CN visit.\par He will monitor and call if remains elevated.\par C/W current meds.\par \par Reminded of NP role and advised to call with any questions/concerns, not limited to increased pain, swelling, drainage, redness, fever, chills, verbalized understanding. \par \par \par \par

## 2019-11-26 NOTE — REVIEW OF SYSTEMS
[Lower Ext Edema] : lower extremity edema [Joint Pain] : joint pain [Joint Swelling] : joint swelling [Joint Stiffness] : joint stiffness [Negative] : Heme/Lymph [FreeTextEntry5] : Afib, HTN, CAD [de-identified] : L knee incision

## 2019-11-26 NOTE — ASSESSMENT
[FreeTextEntry1] : 78 y/o male S/P LTKR appears stable  with typical post op swelling and mild pain and elevated BP. In addition he c/o calf pain with flexion and walking.

## 2019-12-04 ENCOUNTER — APPOINTMENT (OUTPATIENT)
Dept: ORTHOPEDIC SURGERY | Facility: CLINIC | Age: 79
End: 2019-12-04
Payer: MEDICARE

## 2019-12-04 VITALS
SYSTOLIC BLOOD PRESSURE: 156 MMHG | TEMPERATURE: 98.1 F | DIASTOLIC BLOOD PRESSURE: 74 MMHG | WEIGHT: 160 LBS | HEART RATE: 78 BPM | BODY MASS INDEX: 22.9 KG/M2 | HEIGHT: 70 IN

## 2019-12-04 DIAGNOSIS — Z96.659 PRESENCE OF UNSPECIFIED ARTIFICIAL KNEE JOINT: ICD-10-CM

## 2019-12-04 PROCEDURE — 99024 POSTOP FOLLOW-UP VISIT: CPT

## 2019-12-04 PROCEDURE — 73560 X-RAY EXAM OF KNEE 1 OR 2: CPT | Mod: LT

## 2019-12-09 NOTE — HISTORY OF PRESENT ILLNESS
[de-identified] : Postop left knee [de-identified] : WING ARROYO is a 79-year-old male who comes in today.  He is three weeks status post a left total knee arthroplasty.  Overall he is feeling pretty good but at times he gets pain up to 6/10.   [de-identified] : Left Knee: \par Knee: Range of Motion in Degrees	\par 	                  Claimant/Normal:\par 		\par Flexion Active            90                        135-degrees\par Flexion Passive	  90	                135-degrees	\par Extension Active	  15	                0-5-degrees	\par Extension Passive	  15	                0-5-degrees	\par \par Wound is well healed.  There is no sign of infection.   [de-identified] : Status post left total knee arthroplasty  [de-identified] : Radiographs, two views of the left knee, show excellent position of the implant.  [de-identified] : Patient is progressing slowly but well status post left total knee arthroplasty.  At this time, he will be started on outpatient therapy.  He will be reassessed in six weeks.\par \par The patient has been advised that his blood pressure today was outside normal ranges and the patient was instructed accordingly. Our Blood Pressure Monitoring Sheet with instructions was given to the patient.\par

## 2019-12-09 NOTE — ADDENDUM
[FreeTextEntry1] : This note was written by Dotty Carr on 12/06/2019 acting as scribe for Jay Rios III, MD

## 2020-01-03 PROBLEM — Z96.659 S/P TKR (TOTAL KNEE REPLACEMENT): Status: ACTIVE | Noted: 2019-11-21

## 2020-01-15 ENCOUNTER — APPOINTMENT (OUTPATIENT)
Dept: ORTHOPEDIC SURGERY | Facility: CLINIC | Age: 80
End: 2020-01-15
Payer: MEDICARE

## 2020-01-15 PROCEDURE — 73560 X-RAY EXAM OF KNEE 1 OR 2: CPT | Mod: LT

## 2020-01-15 PROCEDURE — 99024 POSTOP FOLLOW-UP VISIT: CPT

## 2020-01-21 NOTE — HISTORY OF PRESENT ILLNESS
[de-identified] : Post op Left Knee [de-identified] : Radiographs, two views of the left knee, show excellent position of the implant. [de-identified] : Status post left total knee arthroplasty [de-identified] : The patient comes in today for his left knee. Overall, he is feeling pretty good.  On occasion he gets some swelling. [de-identified] : Left Knee: \par Range of Motion in Degrees	\par 	                  Claimant:	Normal:	\par Flexion Active	  105 	                135-degrees	\par Flexion Passive	  105	                135-degrees	\par Extension Active	   10	                0-5-degrees	\par Extension Passive	   10	                0-5-degrees	\par \par The wounds are well healed.  There is no sign of infection. No instability. [de-identified] : At this time, the patient is doing well overall status post left knee replacement.  I recommended he continue therapy.  He will be reassessed in two months.

## 2020-01-21 NOTE — ADDENDUM
[FreeTextEntry1] : This note was written by Britney Gallardo on 01/21/2020 acting as a scribe for KATARINA MONTEMAYOR III, MD

## 2020-02-10 ENCOUNTER — FORM ENCOUNTER (OUTPATIENT)
Age: 80
End: 2020-02-10

## 2020-03-09 ENCOUNTER — APPOINTMENT (OUTPATIENT)
Dept: ORTHOPEDIC SURGERY | Facility: CLINIC | Age: 80
End: 2020-03-09
Payer: MEDICARE

## 2020-03-09 VITALS
HEIGHT: 70 IN | SYSTOLIC BLOOD PRESSURE: 150 MMHG | DIASTOLIC BLOOD PRESSURE: 92 MMHG | BODY MASS INDEX: 23.62 KG/M2 | WEIGHT: 165 LBS | HEART RATE: 64 BPM

## 2020-03-09 DIAGNOSIS — Z96.652 PRESENCE OF LEFT ARTIFICIAL KNEE JOINT: ICD-10-CM

## 2020-03-09 PROCEDURE — 73560 X-RAY EXAM OF KNEE 1 OR 2: CPT | Mod: LT

## 2020-03-09 PROCEDURE — 99213 OFFICE O/P EST LOW 20 MIN: CPT

## 2020-03-10 NOTE — ADDENDUM
[FreeTextEntry1] : This note was written by Erik Son on 03/10/2020, acting as a scribe for Jay Rios III, MD

## 2020-03-10 NOTE — CONSULT LETTER
[Dear  ___] : Dear  [unfilled], [Referral Letter:] : I am referring [unfilled] to you for further evaluation.  My most recent evaluation follows. [Referral Closing:] : Thank you very much for seeing this patient.  If you have any questions, please do not hesitate to contact me. [Sincerely,] : Sincerely, [FreeTextEntry3] : Jay Rios III, MD \par DUNG/june\par

## 2020-03-10 NOTE — HISTORY OF PRESENT ILLNESS
[de-identified] : The patient comes in today for his left knee.  He states he is having episodes of numbness behind his knee.  He points from his upper thigh posterior and all the way down to his ankle with numbness and tingling.  He states this is affecting his rehab.\par

## 2020-03-10 NOTE — DISCUSSION/SUMMARY
[de-identified] : At this time, I think overall the patient is doing well status post left total knee replacement and his progress is being angered by the potential of sciatica.  He is being referred for neurologic evaluation.  \par

## 2020-03-10 NOTE — PHYSICAL EXAM
[de-identified] : Left Knee: Range of Motion in Degrees\par 	\par 	                  Claimant:    Normal:	\par Flexion Active	    120 	    135-degrees	\par Flexion Passive	    120	    135-degrees	\par Extension Active	    5	    0-5-degrees	\par Extension Passive	    5	    0-5-degrees	\par \par The wound is well healed.   No instability.  \par   [de-identified] : Gait and Station:  Ambulating with a slightly antalgic to antalgic gait.  Normal Station.  [de-identified] : Appearance:  Well developed, well-nourished male in no acute distress.\par   [de-identified] : Radiographs, one to two views of the left knee, show excellent position of the implant.\par

## 2020-04-07 ENCOUNTER — APPOINTMENT (OUTPATIENT)
Dept: NEUROLOGY | Facility: CLINIC | Age: 80
End: 2020-04-07

## 2020-12-29 ENCOUNTER — APPOINTMENT (OUTPATIENT)
Dept: UROLOGY | Facility: CLINIC | Age: 80
End: 2020-12-29
Payer: MEDICARE

## 2020-12-29 VITALS
DIASTOLIC BLOOD PRESSURE: 85 MMHG | BODY MASS INDEX: 23.48 KG/M2 | HEART RATE: 80 BPM | HEIGHT: 70 IN | WEIGHT: 164 LBS | SYSTOLIC BLOOD PRESSURE: 167 MMHG | TEMPERATURE: 97.7 F | OXYGEN SATURATION: 96 %

## 2020-12-29 DIAGNOSIS — R97.20 ELEVATED PROSTATE, SPECIFIC ANTIGEN [PSA]: ICD-10-CM

## 2020-12-29 PROCEDURE — 99204 OFFICE O/P NEW MOD 45 MIN: CPT

## 2020-12-29 RX ORDER — AMOXICILLIN 875 MG/1
875 TABLET, FILM COATED ORAL
Qty: 14 | Refills: 0 | Status: COMPLETED | COMMUNITY
Start: 2020-09-29

## 2020-12-29 RX ORDER — ECONAZOLE NITRATE 10 MG/G
1 CREAM TOPICAL
Qty: 85 | Refills: 0 | Status: COMPLETED | COMMUNITY
Start: 2020-07-09

## 2020-12-29 RX ORDER — AMOXICILLIN 500 MG/1
500 CAPSULE ORAL
Qty: 20 | Refills: 0 | Status: COMPLETED | COMMUNITY
Start: 2020-09-29

## 2020-12-29 NOTE — ASSESSMENT
[FreeTextEntry1] : 79 yo male with elevated PSA. Discussed with patient that PSA is imprecise test. PSA can be elevated due to benign prostate enlargement, prostate stimulation, sexual activity, urinary tract infection, prostatitis, as well as prostate cancer. There is no value for PSA which is diagnostic for prostate cancer, nor is there value which conclusively excludes prostate cancer. PSA simply stratifies risk for prostate cancer and diagnosis of prostate cancer requires prostate biopsy.\par Prostate biopsy was recommended. The risks, including bleeding, infection, urinary retention were discussed with the patient. Pt will administer Fleets enema the morning of procedure. He will eat a light breakfast. All questions and concerns addressed. Patient will schedule the biopsy after speaking with his cardiologist to see if it is ok to hold Eliquis 2 days prior to the prostate biopsy.

## 2020-12-29 NOTE — HISTORY OF PRESENT ILLNESS
[FreeTextEntry1] : 80 year old man seen 12/29/2020  with complaint of elevated PSA. This began 11/12/20, where it was found to be 7.7ng/mL.  Most recently, the PSA was 9.040 ng/mL on 11/30/20. Patient reports his baseline PSA was around 2 in the past and few years ago he [facility] but does not recall the value. \par It is not associated with LUTS.\par \par No hematuria, no dysuria, no frequency, no urgency, no hesitancy, no straining. No incontinence. \par No fevers, no chills, no nausea, no vomiting, no flank pain.\par \par No family hx of prostate ca.

## 2021-01-01 NOTE — PATIENT PROFILE ADULT - VISION (WITH CORRECTIVE LENSES IF THE PATIENT USUALLY WEARS THEM):
Normal vision: sees adequately in most situations; can see medication labels, newsprint
actual/infant

## 2025-03-04 ENCOUNTER — OFFICE (OUTPATIENT)
Dept: URBAN - METROPOLITAN AREA CLINIC 88 | Facility: CLINIC | Age: 85
Setting detail: OPHTHALMOLOGY
End: 2025-03-04
Payer: MEDICARE

## 2025-03-04 DIAGNOSIS — H43.811: ICD-10-CM

## 2025-03-04 DIAGNOSIS — H25.13: ICD-10-CM

## 2025-03-04 DIAGNOSIS — H43.812: ICD-10-CM

## 2025-03-04 PROCEDURE — 92134 CPTRZ OPH DX IMG PST SGM RTA: CPT | Performed by: OPHTHALMOLOGY

## 2025-03-04 PROCEDURE — 92004 COMPRE OPH EXAM NEW PT 1/>: CPT | Performed by: OPHTHALMOLOGY

## 2025-03-04 ASSESSMENT — CONFRONTATIONAL VISUAL FIELD TEST (CVF)
OD_FINDINGS: FULL
OS_FINDINGS: FULL

## 2025-03-04 ASSESSMENT — VISUAL ACUITY
OS_BCVA: 20/40-1
OD_BCVA: 20/20-1

## 2025-03-04 ASSESSMENT — TONOMETRY
OD_IOP_MMHG: 10
OS_IOP_MMHG: 11

## 2025-03-11 ENCOUNTER — OFFICE (OUTPATIENT)
Dept: URBAN - METROPOLITAN AREA CLINIC 12 | Facility: CLINIC | Age: 85
Setting detail: OPHTHALMOLOGY
End: 2025-03-11
Payer: MEDICARE

## 2025-03-11 DIAGNOSIS — H52.4: ICD-10-CM

## 2025-03-11 DIAGNOSIS — H43.811: ICD-10-CM

## 2025-03-11 DIAGNOSIS — Q14.1: ICD-10-CM

## 2025-03-11 DIAGNOSIS — H25.13: ICD-10-CM

## 2025-03-11 PROBLEM — H52.7 REFRACTIVE ERROR: Status: ACTIVE | Noted: 2025-03-11

## 2025-03-11 PROCEDURE — 92014 COMPRE OPH EXAM EST PT 1/>: CPT | Performed by: STUDENT IN AN ORGANIZED HEALTH CARE EDUCATION/TRAINING PROGRAM

## 2025-03-11 PROCEDURE — 92015 DETERMINE REFRACTIVE STATE: CPT | Performed by: STUDENT IN AN ORGANIZED HEALTH CARE EDUCATION/TRAINING PROGRAM

## 2025-03-11 PROCEDURE — 92250 FUNDUS PHOTOGRAPHY W/I&R: CPT | Performed by: STUDENT IN AN ORGANIZED HEALTH CARE EDUCATION/TRAINING PROGRAM

## 2025-03-11 ASSESSMENT — TONOMETRY
OD_IOP_MMHG: 12
OS_IOP_MMHG: 14

## 2025-03-11 ASSESSMENT — KERATOMETRY
OS_K1POWER_DIOPTERS: 43.00
OS_K2POWER_DIOPTERS: 45.00
METHOD_AUTO_MANUAL: AUTO
OS_AXISANGLE_DEGREES: 174
OD_AXISANGLE_DEGREES: 005
OD_K2POWER_DIOPTERS: 45.25
OD_K1POWER_DIOPTERS: 43.00

## 2025-03-11 ASSESSMENT — REFRACTION_MANIFEST
OD_VA1: 20/40
OS_SPHERE: -0.50
OS_VA1: 20/25
OD_AXIS: 095
OS_CYLINDER: -2.25
OD_SPHERE: -2.00
OD_ADD: +2.75
OD_CYLINDER: -2.25
OS_AXIS: 085
OS_ADD: +2.75

## 2025-03-11 ASSESSMENT — REFRACTION_CURRENTRX
OD_CYLINDER: -2.00
OD_OVR_VA: 20/
OS_VPRISM_DIRECTION: PROGS
OD_ADD: +2.75
OD_AXIS: 100
OS_OVR_VA: 20/
OS_AXIS: 075
OD_SPHERE: -2.00
OS_SPHERE: -0.75
OS_CYLINDER: -1.75
OS_ADD: +2.75
OD_VPRISM_DIRECTION: PROGS

## 2025-03-11 ASSESSMENT — CONFRONTATIONAL VISUAL FIELD TEST (CVF)
OS_FINDINGS: FULL
OD_FINDINGS: FULL

## 2025-03-11 ASSESSMENT — REFRACTION_AUTOREFRACTION
OD_CYLINDER: -2.50
OD_SPHERE: -1.75
OS_CYLINDER: -2.75
OD_AXIS: 098
OS_SPHERE: -0.25
OS_AXIS: 085

## 2025-03-11 ASSESSMENT — VISUAL ACUITY
OD_BCVA: 20/30-1
OS_BCVA: 20/50-2